# Patient Record
Sex: FEMALE | Race: WHITE | Employment: FULL TIME | URBAN - METROPOLITAN AREA
[De-identification: names, ages, dates, MRNs, and addresses within clinical notes are randomized per-mention and may not be internally consistent; named-entity substitution may affect disease eponyms.]

---

## 2021-11-04 ENCOUNTER — HOSPITAL ENCOUNTER (EMERGENCY)
Age: 21
Discharge: HOME | End: 2021-11-04
Attending: STUDENT IN AN ORGANIZED HEALTH CARE EDUCATION/TRAINING PROGRAM

## 2021-11-04 VITALS
DIASTOLIC BLOOD PRESSURE: 88 MMHG | HEIGHT: 60 IN | OXYGEN SATURATION: 100 % | RESPIRATION RATE: 18 BRPM | TEMPERATURE: 98.2 F | HEART RATE: 73 BPM | BODY MASS INDEX: 46.82 KG/M2 | WEIGHT: 238.5 LBS | SYSTOLIC BLOOD PRESSURE: 133 MMHG

## 2021-11-04 DIAGNOSIS — G89.29 CHRONIC LOW BACK PAIN, UNSPECIFIED BACK PAIN LATERALITY, UNSPECIFIED WHETHER SCIATICA PRESENT: ICD-10-CM

## 2021-11-04 DIAGNOSIS — M54.50 CHRONIC LOW BACK PAIN, UNSPECIFIED BACK PAIN LATERALITY, UNSPECIFIED WHETHER SCIATICA PRESENT: ICD-10-CM

## 2021-11-04 DIAGNOSIS — K08.89 PAIN, DENTAL: Primary | ICD-10-CM

## 2021-11-04 PROCEDURE — 6370000000 HC RX 637 (ALT 250 FOR IP): Performed by: STUDENT IN AN ORGANIZED HEALTH CARE EDUCATION/TRAINING PROGRAM

## 2021-11-04 PROCEDURE — 99283 EMERGENCY DEPT VISIT LOW MDM: CPT

## 2021-11-04 RX ORDER — ACETAMINOPHEN 500 MG
1000 TABLET ORAL EVERY 6 HOURS PRN
Qty: 30 TABLET | Refills: 0 | Status: SHIPPED | OUTPATIENT
Start: 2021-11-04 | End: 2022-07-27

## 2021-11-04 RX ORDER — ACETAMINOPHEN 500 MG
1000 TABLET ORAL ONCE
Status: COMPLETED | OUTPATIENT
Start: 2021-11-04 | End: 2021-11-04

## 2021-11-04 RX ORDER — PENICILLIN V POTASSIUM 250 MG/1
500 TABLET ORAL ONCE
Status: COMPLETED | OUTPATIENT
Start: 2021-11-04 | End: 2021-11-04

## 2021-11-04 RX ORDER — PENICILLIN V POTASSIUM 500 MG/1
500 TABLET ORAL 4 TIMES DAILY
Qty: 28 TABLET | Refills: 0 | Status: SHIPPED | OUTPATIENT
Start: 2021-11-04 | End: 2021-11-11

## 2021-11-04 RX ADMIN — ACETAMINOPHEN 1000 MG: 500 TABLET ORAL at 22:19

## 2021-11-04 RX ADMIN — PENICILLIN V POTASSIUM 500 MG: 250 TABLET, FILM COATED ORAL at 22:19

## 2021-11-04 ASSESSMENT — ENCOUNTER SYMPTOMS
ABDOMINAL PAIN: 0
BACK PAIN: 1
EYE DISCHARGE: 0
COLOR CHANGE: 0
SHORTNESS OF BREATH: 0
EYE REDNESS: 0

## 2021-11-04 ASSESSMENT — PAIN SCALES - GENERAL
PAINLEVEL_OUTOF10: 4
PAINLEVEL_OUTOF10: 4

## 2021-11-04 NOTE — Clinical Note
Amanda Ovalles was seen and treated in our emergency department on 11/4/2021. She may return to work on 11/05/2021. If you have any questions or concerns, please don't hesitate to call.       Joy Barraza, DO

## 2021-11-05 NOTE — ED PROVIDER NOTES
[facility]  Emergency Department Encounter     Pt Name: Rashida Love  MRN: [id number]  [place] 2000  Date of evaluation: 11/4/21  PCP:  MD Haja Hardy       Chief Complaint   Patient presents with    Dental Pain    Back Pain       HISTORY OFPRESENT ILLNESS  (Location/Symptom, Timing/Onset, Context/Setting, Quality, Duration, Modifying Factors,Severity.)      Rashida Love is a 23 y.o. female who presents with dental pain, left upper posterior molar ongoing for past week. Worsening. Radiating jaw and ear. Does not have a dentist.  No fevers. No trouble swallowing. Left-sided back pain ongoing for the past 2 years. Has not had any advanced imaging performed. Has not followed up with a neurologist or neurosurgeon. Pain is been intermittent. Numbness to anterior thigh. Worse with forward bending. PAST MEDICAL / SURGICAL / SOCIAL / FAMILY HISTORY      has a past medical history of Asthma. has no past surgical history on file. Social History     Socioeconomic History    Marital status: Single     Spouse name: Not on file    Number of children: Not on file    Years of education: Not on file    Highest education level: Not on file   Occupational History    Not on file   Tobacco Use    Smoking status: Current Every Day Smoker    Smokeless tobacco: Never Used   Substance and Sexual Activity    Alcohol use: Yes    Drug use: Never    Sexual activity: Not on file   Other Topics Concern    Not on file   Social History Narrative    Not on file     Social Determinants of Health     Financial Resource Strain:     Difficulty of Paying Living Expenses:    Food Insecurity:     Worried About Running Out of Food in the Last Year:     [address] in the Last Year:    Transportation Needs:     Lack of Transportation (Medical):      Lack of Transportation (Non-Medical):    Physical Activity:     Days of Exercise per Week:     Minutes of temperature is 98.2 °F (36.8 °C). Her blood pressure is 133/88 and her pulse is 73. Her respiration is 18 and oxygen saturation is 100%. Physical Exam  Vitals and nursing note reviewed. Constitutional:       Appearance: She is well-developed. HENT:      Head: Normocephalic and atraumatic. Nose: Nose normal.      Mouth/Throat:      Mouth: Mucous membranes are moist.      Comments: Uvula midline, no pooling secretions, no muffled voice, left superior posterior for dentition with mild erythema and swelling  Eyes:      General: No scleral icterus. Conjunctiva/sclera: Conjunctivae normal.      Pupils: Pupils are equal, round, and reactive to light. Cardiovascular:      Rate and Rhythm: Normal rate and regular rhythm. Heart sounds: Normal heart sounds. No murmur heard. No friction rub. No gallop. Pulmonary:      Effort: Pulmonary effort is normal. No respiratory distress. Breath sounds: Normal breath sounds. No wheezing or rales. Musculoskeletal:         General: Normal range of motion. Skin:     General: Skin is warm and dry. Findings: No erythema or rash. Neurological:      Mental Status: She is alert and oriented to person, place, and time. Comments: No facial asymmetry, no aphasia, no dysarthria, EOMI, PERRLA; 5/5 strength in upper and lower extremities b/l; reports change in sensation to anterior thigh on left     Psychiatric:         Behavior: Behavior normal.         DIFFERENTIAL  DIAGNOSIS     PLAN (LABS / IMAGING / EKG):  No orders of the defined types were placed in this encounter.       MEDICATIONS ORDERED:  Orders Placed This Encounter   Medications    penicillin v potassium (VEETID) 500 MG tablet     Sig: Take 1 tablet by mouth 4 times daily for 7 days     Dispense:  28 tablet     Refill:  0    acetaminophen (APAP EXTRA STRENGTH) 500 MG tablet     Sig: Take 2 tablets by mouth every 6 hours as needed for Pain     Dispense:  30 tablet     Refill:  0    benzocaine (ORAJEL) 10 % mucosal gel     Sig: Apply to affected tooth up to 4 times a day as needed for pain     Dispense:  9 g     Refill:  0    acetaminophen (TYLENOL) tablet 1,000 mg    penicillin v potassium (VEETID) tablet 500 mg     Order Specific Question:   Antimicrobial Indications     Answer:   Head and Neck Infection       DDX: Dental pain versus chronic back pain    Initial MDM/Plan: 23 y.o. female who presents with dental pain and chronic back pain ongoing for 2 years prior. No red flags. Suspicion for any acute back process. Encouraged to follow-up with primary care doctor for further imaging further evaluation. Dental pain follow-up with dentist.  Started on antibiotics and Orajel. DIAGNOSTIC RESULTS / EMERGENCY DEPARTMENT COURSE / MDM     LABS:  Labs Reviewed - No data to display      RADIOLOGY:  No results found. EMERGENCY DEPARTMENT COURSE:        · Based on the low acuity of concerning symptoms and improvement of symptoms, patient will be discharged with follow up and prescription information listed in the Disposition section. · Patient states they will follow-up with primary care physician and/or return to the emergency department should they experience a change or worsening of symptoms. · Patient will be discharged with resources: summary of visit, instructions, follow-up information, prescriptions if necessary. · Patient/ family instructed to read discharge paperwork. All of their questions and concerns were addressed. · Suspicion for any acute life-threatening processes is low. Patient voices understanding of plan. PROCEDURES:  None    CONSULTS:  None    CRITICAL CARE:  0    FINAL IMPRESSION      1. Pain, dental    2.  Chronic low back pain, unspecified back pain laterality, unspecified whether sciatica present          DISPOSITION / PLAN     DISPOSITION Decision To Discharge 11/04/2021 09:53:03 PM        PATIENTREFERRED TO:  Davi Smith, [address] [place] [address]  [phone]    Schedule an appointment as soon as possible for a visit in 1 week        DISCHARGE MEDICATIONS:  Discharge Medication List as of 11/4/2021  9:55 PM      START taking these medications    Details   penicillin v potassium (VEETID) 500 MG tablet Take 1 tablet by mouth 4 times daily for 7 days, Disp-28 tablet, R-0Print      acetaminophen (APAP EXTRA STRENGTH) 500 MG tablet Take 2 tablets by mouth every 6 hours as needed for Pain, Disp-30 tablet, R-0Print      benzocaine (ORAJEL) 10 % mucosal gel Apply to affected tooth up to 4 times a day as needed for pain, Disp-9 g, R-0, Print             Tamiko Sin,   EmergencyMedicine Attending    (Please note that portions of this note were completed with a voice recognition program.  Efforts were made to edit the dictations but occasionally words are mis-transcribed.)       Tamiko Sin DO  11/05/21 2969

## 2021-11-05 NOTE — ED NOTES
Pt presents to ED with c/o of left sided dental pain, pt states the pain travels down both her legs, pt states legs got weak last night at work and she almost fell from the pain.       Rafi White RN  11/04/21 2127

## 2022-02-14 ENCOUNTER — HOSPITAL ENCOUNTER (EMERGENCY)
Age: 22
Discharge: HOME | End: 2022-02-14
Attending: EMERGENCY MEDICINE

## 2022-02-14 ENCOUNTER — APPOINTMENT (OUTPATIENT)
Dept: GENERAL RADIOLOGY | Age: 22
End: 2022-02-14

## 2022-02-14 VITALS
OXYGEN SATURATION: 100 % | DIASTOLIC BLOOD PRESSURE: 91 MMHG | WEIGHT: 230 LBS | SYSTOLIC BLOOD PRESSURE: 147 MMHG | BODY MASS INDEX: 43.43 KG/M2 | TEMPERATURE: 98.2 F | HEIGHT: 61 IN | HEART RATE: 85 BPM | RESPIRATION RATE: 18 BRPM

## 2022-02-14 DIAGNOSIS — J02.9 ACUTE PHARYNGITIS, UNSPECIFIED ETIOLOGY: Primary | ICD-10-CM

## 2022-02-14 LAB
DIRECT EXAM: NORMAL
Lab: NORMAL
SPECIMEN DESCRIPTION: NORMAL

## 2022-02-14 PROCEDURE — 71045 X-RAY EXAM CHEST 1 VIEW: CPT

## 2022-02-14 PROCEDURE — 99283 EMERGENCY DEPT VISIT LOW MDM: CPT

## 2022-02-14 PROCEDURE — 87880 STREP A ASSAY W/OPTIC: CPT

## 2022-02-14 RX ORDER — BENZONATATE 100 MG/1
100 CAPSULE ORAL 3 TIMES DAILY PRN
Qty: 30 CAPSULE | Refills: 0 | Status: SHIPPED | OUTPATIENT
Start: 2022-02-14 | End: 2022-02-21

## 2022-02-14 RX ORDER — PREDNISONE 50 MG/1
50 TABLET ORAL DAILY
Qty: 5 TABLET | Refills: 0 | Status: SHIPPED | OUTPATIENT
Start: 2022-02-14 | End: 2022-02-19

## 2022-02-14 RX ORDER — AZITHROMYCIN 250 MG/1
250 TABLET, FILM COATED ORAL SEE ADMIN INSTRUCTIONS
Qty: 6 TABLET | Refills: 0 | Status: SHIPPED | OUTPATIENT
Start: 2022-02-14 | End: 2022-02-19

## 2022-02-14 ASSESSMENT — ENCOUNTER SYMPTOMS
SHORTNESS OF BREATH: 0
COUGH: 1
RHINORRHEA: 0
NAUSEA: 0
VOMITING: 0
COLOR CHANGE: 0
SORE THROAT: 0
DIARRHEA: 0
EYE DISCHARGE: 0
EYE REDNESS: 0
SINUS PRESSURE: 1

## 2022-02-14 ASSESSMENT — PAIN SCALES - GENERAL: PAINLEVEL_OUTOF10: 6

## 2022-02-14 NOTE — LETTER
[facility] ED  [address] [place] [address]  Phone: [phone]             February 14, 2022    Patient: Frantz Rivas   YOB: 2000   Date of Visit: 2/14/2022       To Whom It May Concern:    Karyna Miranda was seen and treated in our emergency department on 2/14/2022. She may return to work Thursday 2-17-22.       Sincerely,             Signature:__________________________________

## 2022-02-14 NOTE — ED PROVIDER NOTES
EMERGENCY DEPARTMENT ENCOUNTER    Pt Name: Eulalia Hawk  MRN: [id number]  [place] 2000  Date of evaluation: 2/14/22  CHIEF COMPLAINT       Chief Complaint   Patient presents with    Cough     x several days    Shortness of Breath     HISTORY OF PRESENT ILLNESS   This is a 65-year-old female that presents with complaints of sore throat, congestion, cough and not feeling well. Patient denies any fevers or chills, she complains of some generalized body aches. No previous history of COVID-19, she is not vaccinated. She does have a history of asthma. She states her symptoms began a few days ago. REVIEW OF SYSTEMS     Review of Systems   Constitutional: Negative for chills and fever. HENT: Positive for congestion and sinus pressure. Negative for rhinorrhea and sore throat. Eyes: Negative for discharge, redness and visual disturbance. Respiratory: Positive for cough. Negative for shortness of breath. Cardiovascular: Negative for chest pain, palpitations and leg swelling. Gastrointestinal: Negative for diarrhea, nausea and vomiting. Genitourinary: Negative for dysuria and hematuria. Musculoskeletal: Negative for arthralgias, myalgias and neck pain. Skin: Negative for color change and rash. Neurological: Negative for seizures, weakness and headaches. Psychiatric/Behavioral: Negative for hallucinations, self-injury and suicidal ideas. PASTMEDICAL HISTORY     Past Medical History:   Diagnosis Date    Asthma      Past Problem List  There is no problem list on file for this patient. SURGICAL HISTORY     History reviewed. No pertinent surgical history. CURRENT MEDICATIONS       Previous Medications    ACETAMINOPHEN (APAP EXTRA STRENGTH) 500 MG TABLET    Take 2 tablets by mouth every 6 hours as needed for Pain    BENZOCAINE (ORAJEL) 10 % MUCOSAL GEL    Apply to affected tooth up to 4 times a day as needed for pain     ALLERGIES     is allergic to aspirin.   FAMILY HISTORY     has no family status information on file. SOCIAL HISTORY       Social History     Tobacco Use    Smoking status: Current Every Day Smoker    Smokeless tobacco: Never Used   Substance Use Topics    Alcohol use: Yes    Drug use: Never     PHYSICAL EXAM     INITIAL VITALS: BP (!) 147/91   Pulse 85   Temp 98.2 °F (36.8 °C) (Oral)   Resp 18   Ht 5' 1\" (1.549 m)   Wt 230 lb (104.3 kg)   SpO2 100%   BMI 43.46 kg/m²    Physical Exam  Constitutional:       Appearance: Normal appearance. HENT:      Head: Normocephalic and atraumatic. Mouth/Throat:      Pharynx: Posterior oropharyngeal erythema present. No pharyngeal swelling, oropharyngeal exudate or uvula swelling. Tonsils: No tonsillar exudate or tonsillar abscesses. Eyes:      Extraocular Movements: Extraocular movements intact. Pupils: Pupils are equal, round, and reactive to light. Cardiovascular:      Rate and Rhythm: Normal rate and regular rhythm. Pulmonary:      Effort: Pulmonary effort is normal.      Breath sounds: Normal breath sounds. Abdominal:      General: Abdomen is flat. Palpations: Abdomen is soft. Tenderness: There is no abdominal tenderness. Neurological:      Mental Status: She is alert. MEDICAL DECISION MAKIN-year-old female presents with complaints of nasal congestion cough and sore throat. Plan is strep screen chest x-ray and reevaluation. 4:40 PM EST  Strep negative, chest x-ray clear, plan is discharged with prednisone azithromycin Tessalon and outpatient follow-up.          CRITICAL CARE:       PROCEDURES:    Procedures    DIAGNOSTIC RESULTS   EKG:All EKG's are interpreted by the Emergency Department Physician who either signs or Co-signs this chart in the absence of a cardiologist.        RADIOLOGY:All plain film, CT, MRI, and formal ultrasound images (except ED bedside ultrasound) are read by the radiologist, see reports below, unless otherwisenoted in MDM or here.  XR CHEST PORTABLE    (Results Pending)     LABS: All lab results were reviewed by myself, and all abnormals are listed below. Labs Reviewed   STREP SCREEN GROUP A THROAT       EMERGENCY DEPARTMENTCOURSE:         Vitals:    Vitals:    02/14/22 1551   BP: (!) 147/91   Pulse: 85   Resp: 18   Temp: 98.2 °F (36.8 °C)   TempSrc: Oral   SpO2: 100%   Weight: 230 lb (104.3 kg)   Height: 5' 1\" (1.549 m)       The patient was given the following medications while in the emergency department:  Orders Placed This Encounter   Medications    azithromycin (ZITHROMAX) 250 MG tablet     Sig: Take 1 tablet by mouth See Admin Instructions for 5 days 500mg on day 1 followed by 250mg on days 2 - 5     Dispense:  6 tablet     Refill:  0    predniSONE (DELTASONE) 50 MG tablet     Sig: Take 1 tablet by mouth daily for 5 days     Dispense:  5 tablet     Refill:  0    benzonatate (TESSALON PERLES) 100 MG capsule     Sig: Take 1 capsule by mouth 3 times daily as needed for Cough     Dispense:  30 capsule     Refill:  0     CONSULTS:  None    FINAL IMPRESSION      1. Acute pharyngitis, unspecified etiology          DISPOSITION/PLAN   DISPOSITION Decision To Discharge 02/14/2022 04:38:58 PM      PATIENT REFERRED TO:  Britt Monroy MD  [address] [place] [address]  [phone]    Schedule an appointment as soon as possible for a visit in 2 days      DISCHARGE MEDICATIONS:  New Prescriptions    AZITHROMYCIN (ZITHROMAX) 250 MG TABLET    Take 1 tablet by mouth See Admin Instructions for 5 days 500mg on day 1 followed by 250mg on days 2 - 5    BENZONATATE (TESSALON PERLES) 100 MG CAPSULE    Take 1 capsule by mouth 3 times daily as needed for Cough    PREDNISONE (DELTASONE) 50 MG TABLET    Take 1 tablet by mouth daily for 5 days     The care is provided during an unprecedented national emergency due to the novel coronavirus, COVID 19.   MD Eulalia Jennings MD  02/14/22 9459

## 2022-07-27 ENCOUNTER — HOSPITAL ENCOUNTER (EMERGENCY)
Age: 22
Discharge: HOME | End: 2022-07-27
Attending: EMERGENCY MEDICINE

## 2022-07-27 VITALS
OXYGEN SATURATION: 98 % | HEART RATE: 105 BPM | RESPIRATION RATE: 18 BRPM | SYSTOLIC BLOOD PRESSURE: 157 MMHG | DIASTOLIC BLOOD PRESSURE: 120 MMHG | TEMPERATURE: 98.4 F

## 2022-07-27 DIAGNOSIS — K08.89 PAIN, DENTAL: Primary | ICD-10-CM

## 2022-07-27 PROCEDURE — 96372 THER/PROPH/DIAG INJ SC/IM: CPT

## 2022-07-27 PROCEDURE — 99284 EMERGENCY DEPT VISIT MOD MDM: CPT

## 2022-07-27 PROCEDURE — 6360000002 HC RX W HCPCS: Performed by: NURSE PRACTITIONER

## 2022-07-27 PROCEDURE — 6370000000 HC RX 637 (ALT 250 FOR IP): Performed by: NURSE PRACTITIONER

## 2022-07-27 RX ORDER — KETOROLAC TROMETHAMINE 30 MG/ML
30 INJECTION, SOLUTION INTRAMUSCULAR; INTRAVENOUS ONCE
Status: COMPLETED | OUTPATIENT
Start: 2022-07-27 | End: 2022-07-27

## 2022-07-27 RX ORDER — ACETAMINOPHEN AND CODEINE PHOSPHATE 300; 30 MG/1; MG/1
1 TABLET ORAL EVERY 6 HOURS PRN
Qty: 15 TABLET | Refills: 0 | Status: SHIPPED | OUTPATIENT
Start: 2022-07-27 | End: 2022-07-31

## 2022-07-27 RX ORDER — CHLORHEXIDINE GLUCONATE 0.12 MG/ML
15 RINSE ORAL 2 TIMES DAILY
Qty: 300 ML | Refills: 0 | Status: SHIPPED | OUTPATIENT
Start: 2022-07-27 | End: 2022-08-06

## 2022-07-27 RX ORDER — PENICILLIN V POTASSIUM 500 MG/1
500 TABLET ORAL 4 TIMES DAILY
Qty: 40 TABLET | Refills: 0 | Status: SHIPPED | OUTPATIENT
Start: 2022-07-27 | End: 2022-08-06

## 2022-07-27 RX ORDER — TRAMADOL HYDROCHLORIDE 50 MG/1
50 TABLET ORAL ONCE
Status: COMPLETED | OUTPATIENT
Start: 2022-07-27 | End: 2022-07-27

## 2022-07-27 RX ADMIN — KETOROLAC TROMETHAMINE 30 MG: 30 INJECTION, SOLUTION INTRAMUSCULAR at 16:25

## 2022-07-27 RX ADMIN — TRAMADOL HYDROCHLORIDE 50 MG: 50 TABLET, COATED ORAL at 16:25

## 2022-07-27 ASSESSMENT — ENCOUNTER SYMPTOMS
TROUBLE SWALLOWING: 0
FACIAL SWELLING: 0

## 2022-07-27 ASSESSMENT — PAIN SCALES - GENERAL: PAINLEVEL_OUTOF10: 9

## 2022-07-27 NOTE — Clinical Note
Cedrick Holliday was seen and treated in our emergency department on 7/27/2022. She may return to work on 07/31/2022. If you have any questions or concerns, please don't hesitate to call.       Francisco J Paris, APRN - CNP

## 2022-07-27 NOTE — ED PROVIDER NOTES
[facility] ED  EMERGENCY DEPARTMENT ENCOUNTER      Pt Name: Romel Sun  MRN: [id number]  [place] 2000  Date of evaluation: 7/27/2022  Provider: VIJAYA Hunt CNP    CHIEF COMPLAINT       Chief Complaint   Patient presents with    Dental Pain         HISTORY OFPRESENT ILLNESS  (Location/Symptom, Timing/Onset, Context/Setting, Quality, Duration, Modifying Factors, Severity.)   Romel Sun is a 24 y.o. female who presents to the emergency department by private auto for evaluation of left upper dental pain for the past 3 weeks. Pain is a 10. Does not have a dentist.  No trouble swallowing. Afebrile. Nursing Notes were reviewed. PASTMEDICAL HISTORY     Past Medical History:   Diagnosis Date    Asthma          SURGICAL HISTORY     History reviewed. No pertinent surgical history. CURRENT MEDICATIONS     Previous Medications    No medications on file       ALLERGIES     Aspirin    FAMILY HISTORY     History reviewed. No pertinent family history. SOCIAL HISTORY       Social History     Socioeconomic History    Marital status: Single     Spouse name: None    Number of children: None    Years of education: None    Highest education level: None   Tobacco Use    Smoking status: Every Day    Smokeless tobacco: Never   Substance and Sexual Activity    Alcohol use: Yes    Drug use: Never         REVIEW OF SYSTEMS    (2-9 systems for level 4, 10 or more for level 5)     Review of Systems   Constitutional:  Negative for fever. HENT:  Positive for dental problem. Negative for facial swelling and trouble swallowing. All other systems reviewed and are negative. Except as noted above the remainder of the review of systems was reviewed and negative.      PHYSICAL EXAM    (up to 7 for level 4, 8 or more for level 5)     ED Triage Vitals [07/27/22 1509]   BP Temp Temp Source Heart Rate Resp SpO2 Height Weight   (!) 157/120 98.4 °F (36.9 °C) Oral (!) 105 18 98 % -- --       Physical Exam  Constitutional:       Appearance: Normal appearance. She is well-developed, well-groomed and overweight. HENT:      Head: Normocephalic. Right Ear: External ear normal.      Left Ear: External ear normal.      Nose: Nose normal.      Mouth/Throat:      Lips: Pink. Mouth: Mucous membranes are moist.      Dentition: Abnormal dentition. Dental tenderness, gingival swelling and dental caries present. No dental abscesses. Eyes:      Conjunctiva/sclera: Conjunctivae normal.   Pulmonary:      Effort: Pulmonary effort is normal. No respiratory distress. Musculoskeletal:         General: Normal range of motion. Cervical back: Normal range of motion. Neurological:      Mental Status: She is alert and oriented to person, place, and time. Psychiatric:         Mood and Affect: Mood normal.         Behavior: Behavior normal.         Thought Content: Thought content normal.         Judgment: Judgment normal.         DIAGNOSTIC RESULTS     EKG:All EKG's are interpreted by the Emergency Department Physician who either signs or Co-signs this chart in the absence of a cardiologist.        RADIOLOGY:   Non-plain film images such as CT, Ultrasound and MRI are read by theradiologist. Plain radiographic images are visualized and preliminarily interpreted by the emergency physician with the below findings:        Interpretation per the Radiologist below, if available at the time of this note:    No orders to display         EDBEDSIDE ULTRASOUND:   Performed by Baljit Thorne - none    LABS:  Labs Reviewed - No data to display    All other labs were within normal range or not returned as of this dictation. EMERGENCY DEPARTMENT COURSE andDIFFERENTIAL DIAGNOSIS/MDM:   Patient presents with dental issue as above assessment. No abscess. Patient controlling her secretions. Does not have a dentist.  States she got brought to ER by her mother. Requesting for pain while in the ED.   She was given Toradol and tramadol in the ED. Prescriptions written for Peridex solution, Tylenol codeine, and pen vk. She was provided with a dental clinic list for follow-up. Return precautions provided. Vitals:    Vitals:    07/27/22 1509   BP: (!) 157/120   Pulse: (!) 105   Resp: 18   Temp: 98.4 °F (36.9 °C)   TempSrc: Oral   SpO2: 98%         CONSULTS:  None    RES:  Procedures    FINAL IMPRESSION      1. Pain, dental          DISPOSITION/PLAN   DISPOSITION Decision To Discharge 07/27/2022 04:23:47 PM      PATIENT REFERRED TO:     Follow-up with dentist on clinic list provided. Schedule an appointment as soon as possible for a visit       [facility] ED  [address] [facility]  [phone]    If symptoms worsen    DISCHARGE MEDICATIONS:     New Prescriptions    ACETAMINOPHEN-CODEINE (TYLENOL/CODEINE #3) 300-30 MG PER TABLET    Take 1 tablet by mouth every 6 hours as needed for Pain for up to 4 days. Intended supply: 3 days. Take lowest dose possible to manage pain    CHLORHEXIDINE (PERIDEX) 0.12 % SOLUTION    Take 15 mLs by mouth in the morning and 15 mLs before bedtime. Do all this for 10 days. PENICILLIN V POTASSIUM (VEETID) 500 MG TABLET    Take 1 tablet by mouth in the morning and 1 tablet at noon and 1 tablet in the evening and 1 tablet before bedtime. Do all this for 10 days.      Electronically signed by VIJAYA Warner 7/27/2022 at 4:28 PM           VIJAYA Warner CNP  07/27/22 8636

## 2022-07-27 NOTE — DISCHARGE INSTRUCTIONS
Take medications as prescribed. Do not drive, operate machinery, or consume alcohol taking Tylenol codeine as it can cause drowsiness. Follow-up with dentist on clinic list provided. Return to emergency department symptoms worsen.

## 2022-07-29 NOTE — ED PROVIDER NOTES
eMERGENCY dEPARTMENT eNCOUnter   Independent Attestation     Pt Name: Susan Adair  MRN: [id number]  [place] 2000  Date of evaluation: 7/29/22     Susan Adair is a 24 y.o. female with CC: Dental Pain        This visit was performed by both a physician and an APC. I performed all aspects of the MDM as documented. The care is provided during an unprecedented national emergency due to the novel coronavirus, COVID 19.     Yumiko Mejias MD  Attending Emergency Physician           Nicky Rich MD  07/29/22 2362

## 2023-02-16 ENCOUNTER — APPOINTMENT (OUTPATIENT)
Dept: GENERAL RADIOLOGY | Age: 23
DRG: 053 | End: 2023-02-16
Payer: MEDICAID

## 2023-02-16 ENCOUNTER — HOSPITAL ENCOUNTER (INPATIENT)
Age: 23
LOS: 2 days | Discharge: ANOTHER HOSPITAL | DRG: 053 | End: 2023-02-19
Attending: EMERGENCY MEDICINE | Admitting: STUDENT IN AN ORGANIZED HEALTH CARE EDUCATION/TRAINING PROGRAM
Payer: MEDICAID

## 2023-02-16 ENCOUNTER — APPOINTMENT (OUTPATIENT)
Dept: CT IMAGING | Age: 23
DRG: 053 | End: 2023-02-16
Payer: MEDICAID

## 2023-02-16 DIAGNOSIS — R56.9 SEIZURE-LIKE ACTIVITY (HCC): Primary | ICD-10-CM

## 2023-02-16 DIAGNOSIS — R94.31 ABNORMAL EKG: ICD-10-CM

## 2023-02-16 DIAGNOSIS — N39.0 URINARY TRACT INFECTION WITHOUT HEMATURIA, SITE UNSPECIFIED: ICD-10-CM

## 2023-02-16 PROBLEM — Z72.0 TOBACCO USE: Chronic | Status: ACTIVE | Noted: 2023-02-16

## 2023-02-16 PROBLEM — N30.00 ACUTE CYSTITIS WITHOUT HEMATURIA: Status: ACTIVE | Noted: 2023-02-16

## 2023-02-16 LAB
ABSOLUTE EOS #: 0.19 K/UL (ref 0–0.44)
ABSOLUTE IMMATURE GRANULOCYTE: 0.02 K/UL (ref 0–0.3)
ABSOLUTE LYMPH #: 1.63 K/UL (ref 1.1–3.7)
ABSOLUTE MONO #: 0.71 K/UL (ref 0.1–1.2)
AMPHETAMINE SCREEN URINE: NEGATIVE
ANION GAP SERPL CALCULATED.3IONS-SCNC: 9 MMOL/L (ref 9–17)
BACTERIA: ABNORMAL
BARBITURATE SCREEN URINE: NEGATIVE
BASOPHILS # BLD: 1 % (ref 0–2)
BASOPHILS ABSOLUTE: 0.06 K/UL (ref 0–0.2)
BENZODIAZEPINE SCREEN, URINE: NEGATIVE
BILIRUBIN URINE: NEGATIVE
BUN SERPL-MCNC: 14 MG/DL (ref 6–20)
BUN/CREAT BLD: 25 (ref 9–20)
CALCIUM SERPL-MCNC: 9.2 MG/DL (ref 8.6–10.4)
CANNABINOID SCREEN URINE: NEGATIVE
CHLORIDE SERPL-SCNC: 105 MMOL/L (ref 98–107)
CO2 SERPL-SCNC: 25 MMOL/L (ref 20–31)
COCAINE METABOLITE, URINE: NEGATIVE
COLOR: YELLOW
CREAT SERPL-MCNC: 0.55 MG/DL (ref 0.5–0.9)
EKG ATRIAL RATE: 70 BPM
EKG P AXIS: 20 DEGREES
EKG P-R INTERVAL: 110 MS
EKG Q-T INTERVAL: 396 MS
EKG QRS DURATION: 124 MS
EKG QTC CALCULATION (BAZETT): 427 MS
EKG R AXIS: -32 DEGREES
EKG T AXIS: 53 DEGREES
EKG VENTRICULAR RATE: 70 BPM
EOSINOPHILS RELATIVE PERCENT: 3 % (ref 1–4)
EPITHELIAL CELLS UA: ABNORMAL /HPF (ref 0–5)
ETHANOL PERCENT: <0.01 %
ETHANOL: <10 MG/DL
FENTANYL URINE: NEGATIVE
GFR SERPL CREATININE-BSD FRML MDRD: >60 ML/MIN/1.73M2
GLUCOSE SERPL-MCNC: 86 MG/DL (ref 70–99)
GLUCOSE UR STRIP.AUTO-MCNC: NEGATIVE MG/DL
HCG QUALITATIVE: NEGATIVE
HCT VFR BLD AUTO: 44.6 % (ref 36.3–47.1)
HGB BLD-MCNC: 14.4 G/DL (ref 11.9–15.1)
IMMATURE GRANULOCYTES: 0 %
KETONES UR STRIP.AUTO-MCNC: NEGATIVE MG/DL
LEUKOCYTE ESTERASE UR QL STRIP.AUTO: ABNORMAL
LYMPHOCYTES # BLD: 27 % (ref 24–43)
MCH RBC QN AUTO: 27 PG (ref 25.2–33.5)
MCHC RBC AUTO-ENTMCNC: 32.3 G/DL (ref 28.4–34.8)
MCV RBC AUTO: 83.5 FL (ref 82.6–102.9)
METHADONE SCREEN, URINE: NEGATIVE
MONOCYTES # BLD: 12 % (ref 3–12)
NITRITE UR QL STRIP.AUTO: NEGATIVE
NRBC AUTOMATED: 0 PER 100 WBC
OPIATES, URINE: NEGATIVE
OXYCODONE SCREEN URINE: NEGATIVE
PDW BLD-RTO: 13 % (ref 11.8–14.4)
PHENCYCLIDINE, URINE: NEGATIVE
PLATELET # BLD AUTO: 367 K/UL (ref 138–453)
PMV BLD AUTO: 9.8 FL (ref 8.1–13.5)
POTASSIUM SERPL-SCNC: 3.6 MMOL/L (ref 3.7–5.3)
PROT UR STRIP.AUTO-MCNC: 7 MG/DL (ref 5–8)
PROT UR STRIP.AUTO-MCNC: NEGATIVE MG/DL
RBC # BLD: 5.34 M/UL (ref 3.95–5.11)
RBC CLUMPS #/AREA URNS AUTO: ABNORMAL /HPF (ref 0–2)
SEG NEUTROPHILS: 57 % (ref 36–65)
SEGMENTED NEUTROPHILS ABSOLUTE COUNT: 3.54 K/UL (ref 1.5–8.1)
SODIUM SERPL-SCNC: 139 MMOL/L (ref 135–144)
SPECIFIC GRAVITY UA: 1.01 (ref 1–1.03)
TEST INFORMATION: NORMAL
TURBIDITY: ABNORMAL
URINE HGB: NEGATIVE
UROBILINOGEN, URINE: NORMAL
WBC # BLD AUTO: 6.2 K/UL (ref 3.5–11.3)
WBC UA: ABNORMAL /HPF (ref 0–5)

## 2023-02-16 PROCEDURE — 80307 DRUG TEST PRSMV CHEM ANLYZR: CPT

## 2023-02-16 PROCEDURE — G0378 HOSPITAL OBSERVATION PER HR: HCPCS

## 2023-02-16 PROCEDURE — 2580000003 HC RX 258: Performed by: NURSE PRACTITIONER

## 2023-02-16 PROCEDURE — 96365 THER/PROPH/DIAG IV INF INIT: CPT

## 2023-02-16 PROCEDURE — 71045 X-RAY EXAM CHEST 1 VIEW: CPT

## 2023-02-16 PROCEDURE — 81001 URINALYSIS AUTO W/SCOPE: CPT

## 2023-02-16 PROCEDURE — 96367 TX/PROPH/DG ADDL SEQ IV INF: CPT

## 2023-02-16 PROCEDURE — 84146 ASSAY OF PROLACTIN: CPT

## 2023-02-16 PROCEDURE — 6370000000 HC RX 637 (ALT 250 FOR IP): Performed by: NURSE PRACTITIONER

## 2023-02-16 PROCEDURE — 99285 EMERGENCY DEPT VISIT HI MDM: CPT

## 2023-02-16 PROCEDURE — G0480 DRUG TEST DEF 1-7 CLASSES: HCPCS

## 2023-02-16 PROCEDURE — 6360000002 HC RX W HCPCS: Performed by: NURSE PRACTITIONER

## 2023-02-16 PROCEDURE — 70450 CT HEAD/BRAIN W/O DYE: CPT

## 2023-02-16 PROCEDURE — 99222 1ST HOSP IP/OBS MODERATE 55: CPT | Performed by: NURSE PRACTITIONER

## 2023-02-16 PROCEDURE — 84703 CHORIONIC GONADOTROPIN ASSAY: CPT

## 2023-02-16 PROCEDURE — 93010 ELECTROCARDIOGRAM REPORT: CPT | Performed by: INTERNAL MEDICINE

## 2023-02-16 PROCEDURE — 96375 TX/PRO/DX INJ NEW DRUG ADDON: CPT

## 2023-02-16 PROCEDURE — 93005 ELECTROCARDIOGRAM TRACING: CPT | Performed by: NURSE PRACTITIONER

## 2023-02-16 PROCEDURE — 80048 BASIC METABOLIC PNL TOTAL CA: CPT

## 2023-02-16 PROCEDURE — 85025 COMPLETE CBC W/AUTO DIFF WBC: CPT

## 2023-02-16 RX ORDER — LEVETIRACETAM 500 MG/1
500 TABLET ORAL 2 TIMES DAILY
Status: DISCONTINUED | OUTPATIENT
Start: 2023-02-17 | End: 2023-02-19 | Stop reason: HOSPADM

## 2023-02-16 RX ORDER — POLYETHYLENE GLYCOL 3350 17 G/17G
17 POWDER, FOR SOLUTION ORAL DAILY PRN
Status: DISCONTINUED | OUTPATIENT
Start: 2023-02-16 | End: 2023-02-19 | Stop reason: HOSPADM

## 2023-02-16 RX ORDER — MORPHINE SULFATE 2 MG/ML
2 INJECTION, SOLUTION INTRAMUSCULAR; INTRAVENOUS ONCE
Status: COMPLETED | OUTPATIENT
Start: 2023-02-16 | End: 2023-02-16

## 2023-02-16 RX ORDER — SODIUM CHLORIDE 9 MG/ML
INJECTION, SOLUTION INTRAVENOUS CONTINUOUS
Status: DISCONTINUED | OUTPATIENT
Start: 2023-02-16 | End: 2023-02-19 | Stop reason: HOSPADM

## 2023-02-16 RX ORDER — ONDANSETRON 4 MG/1
4 TABLET, ORALLY DISINTEGRATING ORAL EVERY 8 HOURS PRN
Status: DISCONTINUED | OUTPATIENT
Start: 2023-02-16 | End: 2023-02-19 | Stop reason: HOSPADM

## 2023-02-16 RX ORDER — SODIUM CHLORIDE 0.9 % (FLUSH) 0.9 %
5-40 SYRINGE (ML) INJECTION PRN
Status: DISCONTINUED | OUTPATIENT
Start: 2023-02-16 | End: 2023-02-19 | Stop reason: HOSPADM

## 2023-02-16 RX ORDER — ENOXAPARIN SODIUM 100 MG/ML
40 INJECTION SUBCUTANEOUS DAILY
Status: DISCONTINUED | OUTPATIENT
Start: 2023-02-17 | End: 2023-02-19 | Stop reason: HOSPADM

## 2023-02-16 RX ORDER — SODIUM CHLORIDE 0.9 % (FLUSH) 0.9 %
5-40 SYRINGE (ML) INJECTION EVERY 12 HOURS SCHEDULED
Status: DISCONTINUED | OUTPATIENT
Start: 2023-02-16 | End: 2023-02-19 | Stop reason: HOSPADM

## 2023-02-16 RX ORDER — LEVETIRACETAM 10 MG/ML
1000 INJECTION INTRAVASCULAR ONCE
Status: COMPLETED | OUTPATIENT
Start: 2023-02-16 | End: 2023-02-16

## 2023-02-16 RX ORDER — ACETAMINOPHEN 325 MG/1
650 TABLET ORAL EVERY 6 HOURS PRN
Status: DISCONTINUED | OUTPATIENT
Start: 2023-02-16 | End: 2023-02-19 | Stop reason: HOSPADM

## 2023-02-16 RX ORDER — ONDANSETRON 2 MG/ML
4 INJECTION INTRAMUSCULAR; INTRAVENOUS EVERY 6 HOURS PRN
Status: DISCONTINUED | OUTPATIENT
Start: 2023-02-16 | End: 2023-02-19 | Stop reason: HOSPADM

## 2023-02-16 RX ORDER — ACETAMINOPHEN 650 MG/1
650 SUPPOSITORY RECTAL EVERY 6 HOURS PRN
Status: DISCONTINUED | OUTPATIENT
Start: 2023-02-16 | End: 2023-02-19 | Stop reason: HOSPADM

## 2023-02-16 RX ORDER — SODIUM CHLORIDE 9 MG/ML
INJECTION, SOLUTION INTRAVENOUS PRN
Status: DISCONTINUED | OUTPATIENT
Start: 2023-02-16 | End: 2023-02-19 | Stop reason: HOSPADM

## 2023-02-16 RX ORDER — 0.9 % SODIUM CHLORIDE 0.9 %
1000 INTRAVENOUS SOLUTION INTRAVENOUS ONCE
Status: COMPLETED | OUTPATIENT
Start: 2023-02-16 | End: 2023-02-16

## 2023-02-16 RX ADMIN — MORPHINE SULFATE 2 MG: 2 INJECTION, SOLUTION INTRAMUSCULAR; INTRAVENOUS at 22:19

## 2023-02-16 RX ADMIN — ACETAMINOPHEN 650 MG: 325 TABLET ORAL at 19:55

## 2023-02-16 RX ADMIN — SODIUM CHLORIDE: 9 INJECTION, SOLUTION INTRAVENOUS at 22:29

## 2023-02-16 RX ADMIN — CEFTRIAXONE SODIUM 1000 MG: 1 INJECTION, POWDER, FOR SOLUTION INTRAMUSCULAR; INTRAVENOUS at 18:21

## 2023-02-16 RX ADMIN — LEVETIRACETAM 1000 MG: 10 INJECTION INTRAVENOUS at 19:26

## 2023-02-16 RX ADMIN — SODIUM CHLORIDE, PRESERVATIVE FREE 10 ML: 5 INJECTION INTRAVENOUS at 22:21

## 2023-02-16 RX ADMIN — SODIUM CHLORIDE 1000 ML: 9 INJECTION, SOLUTION INTRAVENOUS at 19:43

## 2023-02-16 ASSESSMENT — PAIN SCALES - GENERAL
PAINLEVEL_OUTOF10: 5
PAINLEVEL_OUTOF10: 7

## 2023-02-16 ASSESSMENT — ENCOUNTER SYMPTOMS
SHORTNESS OF BREATH: 0
ABDOMINAL PAIN: 0
COUGH: 0

## 2023-02-16 ASSESSMENT — PAIN DESCRIPTION - ORIENTATION: ORIENTATION: MID

## 2023-02-16 ASSESSMENT — PAIN DESCRIPTION - DESCRIPTORS: DESCRIPTORS: PRESSURE

## 2023-02-16 ASSESSMENT — VISUAL ACUITY: OU: 1

## 2023-02-16 ASSESSMENT — PAIN DESCRIPTION - LOCATION: LOCATION: HEAD

## 2023-02-16 NOTE — ED PROVIDER NOTES
[address] ED  eMERGENCY dEPARTMENT eNCOUnter      Pt Name: Alfreida Hatchet  MRN: [id number]  [place] 2000  Date of evaluation: 2/16/2023  Provider: Phillip Samson       Chief Complaint   Patient presents with    Seizures     Pt states she had 3 seizures today and 3 last night. Headache         HISTORY OF PRESENT ILLNESS  (Location/Symptom, Timing/Onset, Context/Setting, Quality, Duration, Modifying Factors, Severity.)   Alfreida Hatchet is a 20 y.o. female who presents to the emergency department by EMS for evaluation of headache and seizures. Patient does not have a history of seizures. Patient states she had 3 seizures last night and 3 seizures today. Patient states one of her seizures and was witnessed by her boyfriend who claimed it lasted for 5 minutes. Patient did not lose bowel or bladder control. Did not bite her tongue. Has a mild headache in the back of her head. States 3 days ago at work she did strike the back of her head on a shelf at work. Denies loss of consciousness. Denies a disturbance vomiting, cough, shortness of breath, abdominal pain or dysuria. Denies drug or alcohol use. Patient is not postictal upon arrival.      Nursing Notes were reviewed. ALLERGIES     Aspirin    CURRENT MEDICATIONS       Previous Medications    No medications on file       PAST MEDICAL HISTORY         Diagnosis Date    Asthma        SURGICAL HISTORY     History reviewed. No pertinent surgical history. FAMILY HISTORY     History reviewed. No pertinent family history. No family status information on file. SOCIAL HISTORY      reports that she has been smoking. She has never used smokeless tobacco. She reports current alcohol use. She reports that she does not use drugs. REVIEW OF SYSTEMS    (2-9 systems for level 4, 10 or more for level 5)   Review of Systems   Constitutional:  Negative for fever.    Respiratory:  Negative for cough and shortness of breath. Cardiovascular:  Negative for chest pain. Gastrointestinal:  Negative for abdominal pain. Neurological:  Positive for dizziness, seizures and headaches. Negative for facial asymmetry, weakness, light-headedness and numbness. All other systems reviewed and are negative. Except as noted above the remainder of the review of systems was reviewed and negative. PHYSICAL EXAM    (up to 7 for level 4, 8 or more for level 5)     ED Triage Vitals [02/16/23 1501]   BP Temp Temp Source Heart Rate Resp SpO2 Height Weight   136/82 98.6 °F (37 °C) Oral 80 16 97 % 5' (1.524 m) 210 lb (95.3 kg)     Physical Exam  Constitutional:       Appearance: Normal appearance. She is well-developed, well-groomed and normal weight. HENT:      Head: Normocephalic. Right Ear: Hearing and external ear normal.      Left Ear: Hearing and external ear normal.      Nose: Nose normal.      Mouth/Throat:      Lips: Pink. Mouth: Mucous membranes are moist. No lacerations. Pharynx: Oropharynx is clear. Uvula midline. Comments: No tongue laceration noted seizure, migraine, head injury seizure  Eyes:      General: Lids are normal. Vision grossly intact. Extraocular Movements: Extraocular movements intact. Conjunctiva/sclera: Conjunctivae normal.      Pupils: Pupils are equal, round, and reactive to light. Cardiovascular:      Rate and Rhythm: Normal rate and regular rhythm. Heart sounds: Normal heart sounds, S1 normal and S2 normal.   Pulmonary:      Effort: Pulmonary effort is normal.      Breath sounds: Normal breath sounds. Abdominal:      General: Bowel sounds are normal.      Palpations: Abdomen is soft. Tenderness: There is no abdominal tenderness. Musculoskeletal:         General: Normal range of motion. Cervical back: Normal range of motion and neck supple. Skin:     General: Skin is warm and dry.    Neurological:      Mental Status: She is alert and oriented to person, place, and time. Cranial Nerves: Cranial nerves 2-12 are intact. Sensory: Sensation is intact. Motor: Motor function is intact. DIAGNOSTIC RESULTS     EKG: All EKG's are interpreted by the Emergency Department Physician who either signs or Co-signs this chart in the absence of a cardiologist.  EKG was interpreted by Dr. Katarina Harkins after completion. RADIOLOGY:   Non-plain film images such as CT, Ultrasound and MRI are read by the radiologist. Plain radiographic images are visualized and preliminarily interpreted by the emergency physician with the below findings:    Interpretation per the Radiologist below, if available at the time of this note:    CT HEAD WO CONTRAST    Result Date: 2/16/2023  EXAMINATION: CT OF THE HEAD WITHOUT CONTRAST  2/16/2023 1:10 pm TECHNIQUE: CT of the head was performed without the administration of intravenous contrast. Automated exposure control, iterative reconstruction, and/or weight based adjustment of the mA/kV was utilized to reduce the radiation dose to as low as reasonably achievable. COMPARISON: None. HISTORY: ORDERING SYSTEM PROVIDED HISTORY: Seizure-like activity TECHNOLOGIST PROVIDED HISTORY: Seizure-like activity Decision Support Exception - unselect if not a suspected or confirmed emergency medical condition->Emergency Medical Condition (MA) Is the patient pregnant?->No Reason for Exam: 3 seizures today and 3 last night, headache, no H/O seizures. Pt also hit back of head 3 days ago at work. FINDINGS: BRAIN/VENTRICLES: There is no acute intracranial hemorrhage, mass effect or midline shift. No abnormal extra-axial fluid collection. The gray-white differentiation is maintained without evidence of an acute infarct. There is no evidence of hydrocephalus. ORBITS: The visualized portion of the orbits demonstrate no acute abnormality. SINUSES: The visualized paranasal sinuses and mastoid air cells demonstrate no acute abnormality.  SOFT TISSUES/SKULL:  No acute abnormality of the visualized skull or soft tissues. No acute intracranial findings. XR CHEST PORTABLE    Result Date: 2/16/2023  EXAMINATION: ONE XRAY VIEW OF THE CHEST 2/16/2023 12:30 pm COMPARISON: 02/14/2023. HISTORY: ORDERING SYSTEM PROVIDED HISTORY: seizure TECHNOLOGIST PROVIDED HISTORY: seizure Reason for Exam: seizure FINDINGS: The lungs and costophrenic angles are clear. The cardiomediastinal silhouette and pulmonary vessels appear normal.     No radiographic evidence of an acute cardiopulmonary process. LABS:  Labs Reviewed   BASIC METABOLIC PANEL - Abnormal; Notable for the following components:       Result Value    Bun/Cre Ratio 25 (*)     Potassium 3.6 (*)     All other components within normal limits   CBC WITH AUTO DIFFERENTIAL - Abnormal; Notable for the following components:    RBC 5.34 (*)     All other components within normal limits   URINALYSIS WITH MICROSCOPIC - Abnormal; Notable for the following components:    Turbidity UA SLIGHTLY CLOUDY (*)     Leukocyte Esterase, Urine MOD (*)     Bacteria, UA MANY (*)     All other components within normal limits   CULTURE, URINE   HCG, SERUM, QUALITATIVE   URINE DRUG SCREEN   ETHANOL   PROLACTIN       All other labs were within normal range or not returned as of this dictation.     EMERGENCY DEPARTMENT COURSE and DIFFERENTIAL DIAGNOSIS/MDM:   Vitals:    Vitals:    02/16/23 1634 02/16/23 1700 02/16/23 1730 02/16/23 1802   BP:  (!) 101/53 100/64 (!) 99/58   Pulse: 66 63 70 67   Resp: 12 21 23 17   Temp:       TempSrc:       SpO2: 97% 98% 98% 97%   Weight:       Height:             MEDICATIONS GIVEN IN THE ED:  Medications   levETIRAcetam (KEPPRA) 1000 mg/100 mL IVPB (has no administration in time range)   0.9 % sodium chloride bolus (has no administration in time range)   cefTRIAXone (ROCEPHIN) 1,000 mg in sodium chloride 0.9 % 50 mL IVPB (mini-bag) (1,000 mg IntraVENous New Bag 2/16/23 1821)       CLINICAL DECISION MAKING:  The patient presented alert with a nontoxic appearance and was seen in conjunction with Dr. Gordon Skiff    DDx include seizure, migraine, head injury      I will order labs including   Orders Placed This Encounter   Procedures    Culture, Urine     Standing Status:   Standing     Number of Occurrences:   1    XR CHEST PORTABLE     Standing Status:   Standing     Number of Occurrences:   1     Order Specific Question:   Reason for exam:     Answer:   seizure    CT HEAD WO CONTRAST     Standing Status:   Standing     Number of Occurrences:   1     Order Specific Question:   Reason for exam:     Answer:   Seizure-like activity     Order Specific Question:   Has a \"code stroke\" or \"stroke alert\" been called? Answer:   No     Order Specific Question:   Decision Support Exception - unselect if not a suspected or confirmed emergency medical condition     Answer:   Emergency Medical Condition (MA) [1]    BMP     Standing Status:   Standing     Number of Occurrences:   1    CBC with Auto Differential     Standing Status:   Standing     Number of Occurrences:   1    HCG Qualitative, Serum     Standing Status:   Standing     Number of Occurrences:   1    Urinalysis with Microscopic     Standing Status:   Standing     Number of Occurrences:   1    Drug Scr, Abuse, Ur     Standing Status:   Standing     Number of Occurrences:   1    Ethanol     Standing Status:   Standing     Number of Occurrences:   1    Prolactin     Standing Status:   Standing     Number of Occurrences:   1    Telemetry monitoring - 72 hour duration     Standing Status:   Standing     Number of Occurrences:   1     Order Specific Question:   Patient may go off unit without monitor     Answer:   Yes     Order Specific Question:   Type: Answer:   Bedside    Inpatient consult to Internal Medicine     Standing Status:   Standing     Number of Occurrences:   1     Order Specific Question:   Reason for Consult?      Answer:   Seizure-like activity, abnormal EKG    Inpatient consult to Neurology     Standing Status:   Standing     Number of Occurrences:   1     Order Specific Question:   Reason for Consult? Answer:   Seizure-like activity    EKG 12 Lead     Standing Status:   Standing     Number of Occurrences:   1     Order Specific Question:   Reason for Exam?     Answer: Other     Comments:   seizure    Insert peripheral IV     Standing Status:   Standing     Number of Occurrences:   1    Seizure precautions     Standing Status:   Standing     Number of Occurrences:   1    complete CXR and monitor closely. The patient was involved in his/her plan of care through shared decision making. The testing that was ordered was discussed with the patient. Any medications that may have been ordered were discussed with the patient. I have reviewed the patient's previous medical records using the electronic health record that we have available. Labs and imaging were reviewed. Imaging was reviewed and reported by the radiologist. Results showed CT scan of head and chest x-ray no acute findings. No neurological deficits. Pregnancy test negative. Urinalysis shows infection. Patient started on Rocephin. Given a liter of normal saline. Given a loading dose of Keppra per neurology. EKG reviewed by Dr. Ginger Kwok shows Mary-Parkinson-White syndrome. Labs reviewed. Test results and admission to hospital discussed with the patient by Dr. Ginger Kwok    Neurology consult-I spoke with Dr. Lilliana Christianson who recommended giving patient loading dose of Keppra and patient can either stay at [facility] for admission or be transferred to [address] [place] [address]. If patient develops continuous seizure activity she will need to be transferred to [address] [place] [address] for evaluation. However, [address] [place] [address] spoke with [facility] and [address] [place] [address] is closed to transfers right now as they are discharge dependent.   I did notify [facility] about this and patient will stay at [facility] for now. I spoke with Madeline Pérez from [facility] who accepts admission. Care was provided during an unprecedented national emergency due to the novel coronavirus, Covid-19. CONSULTS:  IP CONSULT TO INTERNAL MEDICINE  IP CONSULT TO NEUROLOGY    PROCEDURES:  Procedures    FINAL IMPRESSION      1. Seizure-like activity ([facility] [address]    2. Abnormal EKG    3. Urinary tract infection without hematuria, site unspecified            Problem List  There is no problem list on file for this patient. DISPOSITION/PLAN   DISPOSITION Decision To Admit 02/16/2023 06:03:29 PM      PATIENT REFERRED TO:   No follow-up provider specified.     DISCHARGE MEDICATIONS:     New Prescriptions    No medications on file           (Please note that portions of this note were completed with a voice recognition program.  Efforts were made to edit the dictations but occasionally words are mis-transcribed.)    Shorty Faust APRN - [facility]  02/16/23 8200

## 2023-02-16 NOTE — ED NOTES
Mode of arrival (squad #, walk in, police, etc) : [employer], from home      Arrival Note (brief scenario, treatment PTA, etc). : EMS reports patient was alert and oriented x4, on their arrival. Patient reports that she had 3 seizures during the night and 3 today. She states the last 3 were witnessed by her boyfriend. She states that he hold her she stopped breathing during the seizures. She denies any history of seizures, she arrives to ED alert and oriented x4. She reports that 3 days while at work her hair got caught in a rack and the back of her head hit the rack, she denies any loss of consciousness with injury and reports she has only had a headache since incident.             Heidy Michaels RN  02/16/23 6199

## 2023-02-16 NOTE — ED PROVIDER NOTES
[address] [facility] ED  EMERGENCY DEPARTMENT ENCOUNTER   ATTENDING ATTESTATION     Pt Name: Iza Sotelo  MRN: [id number]  [place] 2000  Date of evaluation: 2/16/23       Iza Sotelo is a 20 y.o. female who presents with Seizures (Pt states she had 3 seizures today and 3 last night. ) and Headache      MDM:     Patient's EKG shows sinus rhythm with a rate of 70 OK appears shortened, QRS is mildly prolonged. QTc unremarkable, the patient has left axis deviation, no ST elevations or depressions, nonspecific EKG. Patient's EKG is concerning for Mary-Parkinson-White. Vitals:   Vitals:    02/16/23 1501 02/16/23 1633 02/16/23 1634 02/16/23 1700   BP: 136/82 111/64  (!) 101/53   Pulse: 80  66 63   Resp: 16  12 21   Temp: 98.6 °F (37 °C)      TempSrc: Oral      SpO2: 97%  97% 98%   Weight: 210 lb (95.3 kg)      Height: 5' (1.524 m)            This visit was performed by both a physician and an APC. I personally evaluated and examined the patient.  I performed all aspects of the MDM as documented     Georgina Enciso MD  Attending Emergency  Physician                  Betty López MD  02/16/23 9581

## 2023-02-17 PROBLEM — R56.9 SEIZURE (HCC): Status: ACTIVE | Noted: 2023-02-17

## 2023-02-17 PROBLEM — I45.6 WPW SYNDROME: Status: ACTIVE | Noted: 2023-02-17

## 2023-02-17 PROBLEM — J45.20 MILD INTERMITTENT ASTHMA WITHOUT COMPLICATION: Status: ACTIVE | Noted: 2023-02-17

## 2023-02-17 LAB
ANION GAP SERPL CALCULATED.3IONS-SCNC: 8 MMOL/L (ref 9–17)
BUN SERPL-MCNC: 18 MG/DL (ref 6–20)
BUN/CREAT BLD: 29 (ref 9–20)
CALCIUM SERPL-MCNC: 8.6 MG/DL (ref 8.6–10.4)
CHLORIDE SERPL-SCNC: 110 MMOL/L (ref 98–107)
CO2 SERPL-SCNC: 23 MMOL/L (ref 20–31)
CREAT SERPL-MCNC: 0.63 MG/DL (ref 0.5–0.9)
EKG ATRIAL RATE: 65 BPM
EKG P AXIS: 33 DEGREES
EKG P-R INTERVAL: 104 MS
EKG Q-T INTERVAL: 428 MS
EKG QRS DURATION: 130 MS
EKG QTC CALCULATION (BAZETT): 445 MS
EKG R AXIS: -31 DEGREES
EKG T AXIS: 62 DEGREES
EKG VENTRICULAR RATE: 65 BPM
GFR SERPL CREATININE-BSD FRML MDRD: >60 ML/MIN/1.73M2
GLUCOSE SERPL-MCNC: 100 MG/DL (ref 70–99)
MAGNESIUM SERPL-MCNC: 1.8 MG/DL (ref 1.6–2.6)
POTASSIUM SERPL-SCNC: 4.4 MMOL/L (ref 3.7–5.3)
PROLACTIN: 26.59 NG/ML (ref 4.79–23.3)
SODIUM SERPL-SCNC: 141 MMOL/L (ref 135–144)

## 2023-02-17 PROCEDURE — 2580000003 HC RX 258: Performed by: PSYCHIATRY & NEUROLOGY

## 2023-02-17 PROCEDURE — 36415 COLL VENOUS BLD VENIPUNCTURE: CPT

## 2023-02-17 PROCEDURE — 96372 THER/PROPH/DIAG INJ SC/IM: CPT

## 2023-02-17 PROCEDURE — 96375 TX/PRO/DX INJ NEW DRUG ADDON: CPT

## 2023-02-17 PROCEDURE — 99223 1ST HOSP IP/OBS HIGH 75: CPT | Performed by: PSYCHIATRY & NEUROLOGY

## 2023-02-17 PROCEDURE — 96366 THER/PROPH/DIAG IV INF ADDON: CPT

## 2023-02-17 PROCEDURE — 80048 BASIC METABOLIC PNL TOTAL CA: CPT

## 2023-02-17 PROCEDURE — 6360000002 HC RX W HCPCS: Performed by: PSYCHIATRY & NEUROLOGY

## 2023-02-17 PROCEDURE — C9254 INJECTION, LACOSAMIDE: HCPCS | Performed by: PSYCHIATRY & NEUROLOGY

## 2023-02-17 PROCEDURE — 93005 ELECTROCARDIOGRAM TRACING: CPT | Performed by: PSYCHIATRY & NEUROLOGY

## 2023-02-17 PROCEDURE — 83735 ASSAY OF MAGNESIUM: CPT

## 2023-02-17 PROCEDURE — 6370000000 HC RX 637 (ALT 250 FOR IP): Performed by: NURSE PRACTITIONER

## 2023-02-17 PROCEDURE — 99232 SBSQ HOSP IP/OBS MODERATE 35: CPT | Performed by: STUDENT IN AN ORGANIZED HEALTH CARE EDUCATION/TRAINING PROGRAM

## 2023-02-17 PROCEDURE — 96367 TX/PROPH/DG ADDL SEQ IV INF: CPT

## 2023-02-17 PROCEDURE — 2580000003 HC RX 258: Performed by: NURSE PRACTITIONER

## 2023-02-17 PROCEDURE — 6360000002 HC RX W HCPCS: Performed by: NURSE PRACTITIONER

## 2023-02-17 PROCEDURE — 93010 ELECTROCARDIOGRAM REPORT: CPT | Performed by: INTERNAL MEDICINE

## 2023-02-17 PROCEDURE — 2060000000 HC ICU INTERMEDIATE R&B

## 2023-02-17 RX ORDER — MAGNESIUM SULFATE 1 G/100ML
1000 INJECTION INTRAVENOUS ONCE
Status: COMPLETED | OUTPATIENT
Start: 2023-02-17 | End: 2023-02-17

## 2023-02-17 RX ORDER — BUTALBITAL, ACETAMINOPHEN AND CAFFEINE 50; 325; 40 MG/1; MG/1; MG/1
1 TABLET ORAL EVERY 4 HOURS PRN
Status: CANCELLED | OUTPATIENT
Start: 2023-02-17

## 2023-02-17 RX ORDER — BUDESONIDE AND FORMOTEROL FUMARATE DIHYDRATE 80; 4.5 UG/1; UG/1
1 AEROSOL RESPIRATORY (INHALATION) 2 TIMES DAILY
Status: DISCONTINUED | OUTPATIENT
Start: 2023-02-17 | End: 2023-02-19 | Stop reason: HOSPADM

## 2023-02-17 RX ADMIN — ACETAMINOPHEN 650 MG: 325 TABLET ORAL at 19:37

## 2023-02-17 RX ADMIN — MAGNESIUM SULFATE HEPTAHYDRATE 1000 MG: 1 INJECTION, SOLUTION INTRAVENOUS at 09:53

## 2023-02-17 RX ADMIN — SODIUM CHLORIDE: 9 INJECTION, SOLUTION INTRAVENOUS at 07:36

## 2023-02-17 RX ADMIN — ONDANSETRON 4 MG: 2 INJECTION INTRAMUSCULAR; INTRAVENOUS at 07:34

## 2023-02-17 RX ADMIN — LEVETIRACETAM 500 MG: 500 TABLET, FILM COATED ORAL at 09:02

## 2023-02-17 RX ADMIN — LACOSAMIDE 100 MG: 10 INJECTION, SOLUTION INTRAVENOUS at 23:16

## 2023-02-17 RX ADMIN — LEVETIRACETAM 500 MG: 500 TABLET, FILM COATED ORAL at 19:37

## 2023-02-17 RX ADMIN — CEFTRIAXONE SODIUM 1000 MG: 1 INJECTION, POWDER, FOR SOLUTION INTRAMUSCULAR; INTRAVENOUS at 18:07

## 2023-02-17 RX ADMIN — LACOSAMIDE 200 MG: 10 INJECTION, SOLUTION INTRAVENOUS at 09:02

## 2023-02-17 RX ADMIN — ACETAMINOPHEN 650 MG: 325 TABLET ORAL at 06:10

## 2023-02-17 RX ADMIN — ENOXAPARIN SODIUM 40 MG: 100 INJECTION SUBCUTANEOUS at 09:02

## 2023-02-17 RX ADMIN — SODIUM CHLORIDE: 9 INJECTION, SOLUTION INTRAVENOUS at 19:45

## 2023-02-17 RX ADMIN — LACOSAMIDE 100 MG: 10 INJECTION, SOLUTION INTRAVENOUS at 12:04

## 2023-02-17 ASSESSMENT — PAIN DESCRIPTION - LOCATION: LOCATION: HEAD

## 2023-02-17 ASSESSMENT — PAIN DESCRIPTION - DESCRIPTORS
DESCRIPTORS: DISCOMFORT
DESCRIPTORS: DISCOMFORT

## 2023-02-17 ASSESSMENT — PAIN SCALES - GENERAL
PAINLEVEL_OUTOF10: 0
PAINLEVEL_OUTOF10: 3
PAINLEVEL_OUTOF10: 3

## 2023-02-17 ASSESSMENT — PAIN DESCRIPTION - ORIENTATION: ORIENTATION: MID

## 2023-02-17 NOTE — PROGRESS NOTES
Pt experienced a seizure this morning some time before 7am. Telesitter did not call out to alert writer of occurrence. Pt called out to nurses station to inform writer that they had experienced a seizure. Pt stated to writer that they were conscious during the seizure and that it lasted for a couple minutes. Pt then stated that their headache returned and rated it 7/10. Neurology was contacted and a EKG was ordered and vimpat was ordered.  Pt then became nauseas, prn zofran ws given to pt this am. Neurology to see pt this am.

## 2023-02-17 NOTE — PROGRESS NOTES
Problem: At Risk for Falls  Goal: # Patient does not fall  Outcome: Outcome Met, Continue evaluating goal progress toward completion     Problem: VTE, Risk for  Goal: # Verbalizes understanding of VTE risk factors and prevention  Description: Document education using the patient education activity.   Outcome: Outcome Met, Continue evaluating goal progress toward completion   Patient verbalized understanding of the need for SCD's  Problem: Delirium, Risk for  Goal: # No symptoms of delirium  Description: Evaluate delirium symptoms under active problem when present  Outcome: Outcome Met, Continue evaluating goal progress toward completion   Patient is A/Ox3   Problem: Hemodialysis  Goal: Fistula/graft intact as evidenced by presence of bruit & thrill  Outcome: Outcome Met, Continue evaluating goal progress toward completion      Pt arrived from ED, Vital signs taken, tele monitor placed, all questions asked and answered at this time. Pt has had no seizure activity at this time wcm. Tele sitter in place to monitor for seizure activity.

## 2023-02-17 NOTE — PROGRESS NOTES
[facility]  Office: [address], DO, Kathrin Oconnell, DO, Casper Chad, DO, Jairo Duron Blood, DO, Eyad Ren MD, Pearl Aldana MD, Hema Wesley MD, Susan Nicohlson MD,  Prema Pugh MD, Serafin Calderon MD, Savannah Richmond, DO, Anjali Arango MD,  Stephan Crawford MD, Trey Albarran MD, Cuauhtemoc Hurtado, DO, Shreyas Franklin MD, Tita Duron MD, Berenice Hdz, DO, Qamar Blank MD, Lexus Zhu MD, Phil Kat MD, Dakota Sheppard MD, Allie Vance, DO, Coretta Weems MD, Gini Wells MD, Rodrigo Madrid, Ana Taylor, CNP, Ashley Patel, CNP, Rita Rucker, CNP,  Franki Lundborg, [facility], Alf Pineda, CNP, Edith Booker, CNP, Orin Kong, CNP, Rosana Irby, CNP, Iris Melendez, CNP, Trang Green PAJohnC, Lg Durham, CNS, Lilian Garcia, CNP, Daniel Gregorio, [facility]    Progress Note    2/17/2023    2:22 PM    Name:   Adelso Marks  MRN:     [id number]     Acct:      [de-identified]   Room:   [facility]/1012-02   Day:  0  Admit Date:  2/16/2023  3:00 PM    PCP:   Kashif Garcia MD  Code Status:  Full Code    Subjective:     C/C:   Chief Complaint   Patient presents with    Seizures     Pt states she had 3 seizures today and 3 last night. Headache     Interval History Status: not changed. Had 1 episode of seizure overnight and one episode this morning. First episode of seizure was unwitnessed and second episode was noted by the nurse which lasted less than a minute. Patient had abnormal body movements both upper and lower extremities. Patient was not confused post episode. Neurology was consulted and patient was loaded with Vimpat. Patient denies any psych history. She states that she has history of asthma does not use any inhalers at home.   Complains of dizziness on exertion and headaches    Brief History:        Patient with past medical history of asthma, family history of seizures presented to the ER with complaints of headache and seizures. Patient states she had 3 seizures overnight and 3 seizures today and that her boyfriend witnessed one of the seizures and he claimed that lasted about 5 minutes. Her boyfriend states that she would become very stiff and would not respond to him and remained ' out of it\" for a few minutes after each seizure. On arrival to the ER the patient is not postictal and she denies a history of seizure. CT head was negative. UA is positive for urinary tract infection. Neuro was consulted and they recommended loading Keppra. We will continue fluids and Rocephin. Urine cultures pending. Initial EKG showed finding of WPW syndrome. EKG was repeated and showed repolarization changes    Review of Systems:     Constitutional:  negative for chills, fevers, sweats  Respiratory:  negative for cough, dyspnea on exertion, shortness of breath, wheezing  Cardiovascular:  negative for chest pain, chest pressure/discomfort, lower extremity edema, palpitations  Gastrointestinal:  negative for abdominal pain, constipation, diarrhea, nausea, vomiting  Neurological: Positive for headache    Medications: Allergies: Allergies   Allergen Reactions    Aspirin Other (See Comments)     ulcers       Current Meds:   Scheduled Meds:    lacosamide (VIMPAT) IVPB  100 mg IntraVENous BID    budesonide-formoterol  1 puff Inhalation BID    levETIRAcetam  500 mg Oral BID    sodium chloride flush  5-40 mL IntraVENous 2 times per day    enoxaparin  40 mg SubCUTAneous Daily    cefTRIAXone (ROCEPHIN) IV  1,000 mg IntraVENous Q24H     Continuous Infusions:    sodium chloride 100 mL/hr at 02/17/23 0736    sodium chloride       PRN Meds: sodium chloride flush, sodium chloride, ondansetron **OR** ondansetron, acetaminophen **OR** acetaminophen, polyethylene glycol    Data:     Past Medical History:   has a past medical history of Asthma.     Social History:   reports that she has been smoking. She has never used smokeless tobacco. She reports current alcohol use. She reports that she does not use drugs. Family History:   Family History   Problem Relation Age of Onset    Diabetes Mother     No Known Problems Father     Seizures Brother         since birth       Vitals:  BP (!) 105/58   Pulse 62   Temp 97.9 °F (36.6 °C) (Oral)   Resp 17   Ht 5' (1.524 m)   Wt 215 lb (97.5 kg)   LMP 2023   SpO2 100%   BMI 41.99 kg/m²   Temp (24hrs), Av.1 °F (36.7 °C), Min:97.7 °F (36.5 °C), Max:98.6 °F (37 °C)    No results for input(s): POCGLU in the last 72 hours. I/O (24Hr): Intake/Output Summary (Last 24 hours) at 2023 1422  Last data filed at 2023 0649  Gross per 24 hour   Intake 963.42 ml   Output --   Net 963.42 ml       Labs:  Hematology:  Recent Labs     23  1520   WBC 6.2   RBC 5.34*   HGB 14.4   HCT 44.6   MCV 83.5   MCH 27.0   MCHC 32.3   RDW 13.0      MPV 9.8     Chemistry:  Recent Labs     23  1520 23  0547    141   K 3.6* 4.4    110*   CO2 25 23   GLUCOSE 86 100*   BUN 14 18   CREATININE 0.55 0.63   MG  --  1.8   ANIONGAP 9 8*   LABGLOM >60 >60   CALCIUM 9.2 8.6   No results for input(s): PROT, LABALBU, LABA1C, N5WBFXU, F8TOLQU, FT4, TSH, AST, ALT, LDH, GGT, ALKPHOS, LABGGT, BILITOT, BILIDIR, AMMONIA, AMYLASE, LIPASE, LACTATE, CHOL, HDL, LDLCHOLESTEROL, CHOLHDLRATIO, TRIG, VLDL, HCI92IV, PHENYTOIN, PHENYF, URICACID, POCGLU in the last 72 hours. ABG:No results found for: POCPH, PHART, PH, POCPCO2, CWR8BYC, PCO2, POCPO2, PO2ART, PO2, POCHCO3, ZXM9TPA, HCO3, NBEA, PBEA, BEART, BE, THGBART, THB, UNP8KXI, FHER5GIB, H4ETYCLG, O2SAT, FIO2  Lab Results   Component Value Date/Time    SPECIAL NOT REPORTED 2022 04:07 PM     No results found for: CULTURE    Radiology:  CT HEAD WO CONTRAST    Result Date: 2023  No acute intracranial findings.      XR CHEST PORTABLE    Result Date: 2023  No radiographic evidence of an acute cardiopulmonary process. Physical Examination:        General appearance:  alert, cooperative and mild distress  Mental Status:  oriented to person, place and time and normal affect  Lungs:  clear to auscultation bilaterally, normal effort  Heart:  regular rate and rhythm, no murmur  Abdomen:  soft, nontender, nondistended, normal bowel sounds, no masses, hepatomegaly, splenomegaly  Extremities:  no edema, redness, tenderness in the calves  Skin:  no gross lesions, rashes, induration    Assessment:        Hospital Problems             Last Modified POA    * (Principal) Seizure-like activity ([address] 2/16/2023 Yes    Acute cystitis without hematuria 2/16/2023 Yes    Tobacco use (Chronic) 2/16/2023 Yes    Seizure ([address] 2/17/2023 Yes    WPW syndrome 2/17/2023 Yes    Mild intermittent asthma without complication 6/04/3411 Yes       Plan:        Unsure about seizure episodes  Patient is not postictal, no tongue biting, no incontinence  Maintained on keppra  Started on lamictal  Telesitter in place  Discussed case with neurology, continue antiepileptics  Rocephin for acute cystitis, follow-up with urine cultures  Cardiology consulted for admission findings of WPW syndrome.   Add symbicort for asthma    Garrett Ford MD  2/17/2023  2:22 PM

## 2023-02-17 NOTE — PLAN OF CARE
Pt admitted to unit for seizure activity, pt had been free of seizure activity, telesitter in place for precautions. Vital signs stable. Pt had complaints of headache with double vision on arrival, NP and neurology contacted, pt had a one time dose of morphine ordered, pt satisfied wcm. Neurology to see pt in the morning. Pt safety maintained, all questions asked and answered, bed in lowest position, call light within reach.     Problem: Discharge Planning  Goal: Discharge to home or other facility with appropriate resources  2/17/2023 0452 by Chelly Lopez RN  Outcome: Progressing     Problem: ABCDS Injury Assessment  Goal: Absence of physical injury  2/17/2023 0452 by Chelly Lopez RN  Outcome: Progressing

## 2023-02-17 NOTE — CONSULTS
[facility]   Consult Note         Today's Date: 2/17/2023  Patient Name: Peter Russell  Date of admission: 2/16/2023  3:00 PM  Patient's age: 20 y. o., 2000  Admission Dx: Abnormal EKG [R94.31]  Seizure-like activity ([facility] [address] [R56.9]  Urinary tract infection without hematuria, site unspecified [N39.0]  Seizure ([address] [R56.9]    Reason for Consult:  Cardiac evaluation    Requesting Physician: Stephania Chairez MD    REASON FOR CONSULT:  WPW    History Obtained From:  Patient, chart, staff, records    HISTORY OF PRESENT ILLNESS:      The patient is a 20 y.o. female who is admitted to the hospital for seizures. Noted to have delta waves and WPW pattern on ECG. Is stable and ready for discharge, they just want to establish cardiac care. Past Medical History:   has a past medical history of Asthma. Past Surgical History:   has no past surgical history on file. Home Medications:    Prior to Admission medications    Not on File       lacosamide (VIMPAT) IVPB, 100 mg, IntraVENous, BID    budesonide-formoterol, 1 puff, Inhalation, BID    levETIRAcetam, 500 mg, Oral, BID    sodium chloride flush, 5-40 mL, IntraVENous, 2 times per day    enoxaparin, 40 mg, SubCUTAneous, Daily    cefTRIAXone (ROCEPHIN) IV, 1,000 mg, IntraVENous, Q24H      Allergies:  Aspirin    Social History:   reports that she has been smoking. She has never used smokeless tobacco. She reports current alcohol use. She reports that she does not use drugs. Family History: family history includes Diabetes in her mother; No Known Problems in her father; Seizures in her brother. No h/o sudden cardiac death. REVIEW OF SYSTEMS:    Constitutional: there has been no unanticipated weight loss. There's been No change in energy level, No change in activity level. Eyes: No visual changes or diplopia. No scleral icterus. ENT: No Headaches, hearing loss or vertigo. No mouth sores or sore throat.   Cardiovascular: per HPI  Respiratory: per HPI  Gastrointestinal: No abdominal pain, appetite loss, blood in stools. No change in bowel or bladder habits. Genitourinary: No dysuria, trouble voiding, or hematuria. Musculoskeletal:  No gait disturbance, No weakness or joint complaints. Integumentary: No rash or pruritis. Neurological: No headache, diplopia, change in muscle strength, numbness or tingling. No change in gait, balance, coordination, mood, affect, memory, mentation, behavior. Psychiatric: No anxiety, or depression. Endocrine: No temperature intolerance. No excessive thirst, fluid intake, or urination. No tremor. Hematologic/Lymphatic: No abnormal bruising or bleeding, blood clots or swollen lymph nodes. Allergic/Immunologic: No nasal congestion or hives. PHYSICAL EXAM:      /60   Pulse 67   Temp 98.2 °F (36.8 °C) (Oral)   Resp 17   Ht 5' (1.524 m)   Wt 215 lb (97.5 kg)   LMP 02/01/2023   SpO2 98%   BMI 41.99 kg/m²    Constitutional and General Appearance: alert, cooperative, no distress and appears stated age  [de-identified]: PERRL, no cervical lymphadenopathy. No masses palpable. Normal oral mucosa  Respiratory:  Normal excursion and expansion without use of accessory muscles  Resp Auscultation: Good respiratory effort. No for increased work of breathing. On auscultation: clear to auscultation bilaterally  Cardiovascular: The apical impulse is not displaced  Heart tones are crisp and normal. regular S1 and S2.  Jugular venous pulsation Normal  The carotid upstroke is normal in amplitude and contour without delay or bruit  Peripheral pulses are symmetrical and full   Abdomen:   No masses or tenderness  Bowel sounds present  Extremities:   No Cyanosis or Clubbing   Lower extremity edema: No   Skin: Warm and dry  Neurological:  Alert and oriented.   Moves all extremities well  No abnormalities of mood, affect, memory, mentation, or behavior are noted        EKG:    Date: 02/17/23  Reading: No acute ischemia      LAST ECHO:  Date:  Findings Summary:      LAST Stress Test:   Date of last ST:  Major Findings:    LAST Cardiac Angiography:.  Date:  Findings:      Labs:     CBC:   Recent Labs     02/16/23  1520   WBC 6.2   HGB 14.4   HCT 44.6        BMP:   Recent Labs     02/16/23  1520 02/17/23  0547    141   K 3.6* 4.4   CO2 25 23   BUN 14 18   CREATININE 0.55 0.63   LABGLOM >60 >60   GLUCOSE 86 100*     BNP: No results for input(s): BNP in the last 72 hours. PT/INR: No results for input(s): PROTIME, INR in the last 72 hours. APTT:No results for input(s): APTT in the last 72 hours. CARDIAC ENZYMES:No results for input(s): CKTOTAL, CKMB, CKMBINDEX, TROPONINI in the last 72 hours. FASTING LIPID PANEL:No results found for: HDL, LDLDIRECT, LDLCALC, TRIG  LIVER PROFILE:No results for input(s): AST, ALT, LABALBU in the last 72 hours. Troponins: Invalid input(s): TROPONIN     Other Current Problems  Patient Active Problem List   Diagnosis    Seizure-like activity ([facility] [address]    Acute cystitis without hematuria    Tobacco use    Seizure ([facility] [address]    WPW syndrome    Mild intermittent asthma without complication           IMPRESSION & Recommendations:      WPW and delta wave on ECG. No tachycardia episodes. Stable. Can follow up in clinic. Seizures- per neuro  Tobacco use- counseled  Asthma- stable  Ok to DC home. Follow up in 1-2 weeks in clinic        Discussed with patient, family, and Nurse. Electronically signed by Norma Lu DO on 2/17/2023 at 5:11 PM    Norma Lu, [id number] [facility]  [facility] [employer]  [phone]

## 2023-02-17 NOTE — CARE COORDINATION
Discharge planning    Spoke with [facility] pharmacy . Ran Vimpat 100 bid could not be filled. It did come up that Lacosamide could potentially be ordered but would need a PA. If want to pursue a PA would need to call [phone]    Placed medication on profile for now. Call to the number above and it is a number for [facility]. Would not be able to PA over the weekend. They can fax over a form to be filled.   They did request neurology NPI number and did update them    Updated clinical lead that if they do want lacosamide on discharge would have to have PA form signed and completed       Neurology    [facility]   [phone]

## 2023-02-17 NOTE — ED NOTES
ED to inpatient nurses report     Chief Complaint   Patient presents with    Seizures     Pt states she had 3 seizures today and 3 last night.      Headache      Present to ED from home  LOC: alert drowsy   Vital signs   Vitals:    02/16/23 1846 02/16/23 1856 02/16/23 1906 02/16/23 1916   BP:  108/68     Pulse: 63 63 67 64   Resp: 21 16 19 19   Temp:       TempSrc:       SpO2: 98% 99% 98% 97%   Weight:       Height:          Oxygen Baseline room air    Current needs required see admission orders   SEPSIS: no  no] Lactate X 2 ordered (Yes or No)  [yes] Antibiotics given (Yes or No)  [yes] IV Fluids ordered (Yes or No)             [yes] IV completed (Yes or No)  yes] Hourly Vital Signs (Validated)  [] Outstanding Orders: see admission orders    LDAs:   Peripheral IV 02/16/23 Right Antecubital (Active)   Site Assessment Clean, dry & intact 02/16/23 1520   Line Status Blood return noted 02/16/23 1520   Phlebitis Assessment No symptoms 02/16/23 1520   Infiltration Assessment 0 02/16/23 1520   Dressing Status New dressing applied 02/16/23 1520   Dressing Type Transparent 02/16/23 1520     Mobility: bedrest  Fall Risk: yes  Pending ED orders: see admission orders  Present condition: drowsy oriented x 4  Code Status: full  Consults: IP CONSULT TO INTERNAL MEDICINE  IP CONSULT TO NEUROLOGY  [x]  Hospitalist  Completed  [] yes [] no Who:   []  Medicine  Completed  [] yes [] No Who:   []  Cardiology  Completed  [] yes [] No Who:   []  GI   Completed  [] yes [] No Who:   [x]  Neurology  Completed  [] yes [] No Who: dr bhatti[]  Nephrology Completed  [] yes [] No Who:    []  Vascular  Completed  [] yes [] No Who:   []  Ortho  Completed  [] yes [] No Who:     []  Surgery  Completed  [] yes [] No Who:    []  Urology  Completed  [] yes [] No Who:    []  CT Surgery Completed  [] yes [] No Who:   []  Podiatry  Completed  [] yes [] No Who:    []  Other    Completed  [] yes [] No Who:  Interventions: rocephin keppra iv fluids  Important Events: pt with new onset seizure disorder pt had three seizures yesterday and three today        Electronically signed by Bertrand Vera RN on 2/16/2023 at 7:47 PM     Emily Espinal RN  02/16/23 1952

## 2023-02-17 NOTE — PROGRESS NOTES
[x] Medication Reconciliation was completed and the patient's home medication list was verified. The Med List Status is \"Complete\". The following sources were used to assist with Medication Reconciliation:    [] Patient had a list of medications which was transcribed into the EHR.    [] Patient provided bottles of their medications    [x] Home medications reviewed and confirmed with Tricia Vasquez.    [] Contacted patient's pharmacy to confirm home medications    [] Contacted patient's physician office to confirm home medications    [] Medical Records from another facility and/or Care Everywhere were reviewed      [] There are one or more home medications that need clarification before Medication Reconciliation can be completed. The Med List Status has been marked as In Progress.     To assist with Home Medication Reconciliation the following actions have been taken:    [] Pharmacy medication reconciliation service requested. (Note: This can be done by sending a Perfect Serve message to [facility] Rec Pharmacist or by phoning [phone].)  [] Family requested to bring medications into the hospital  [] Family requested to call hospital with medication list  [] Message left with physician office  [] Request for medical records made to   [] Other

## 2023-02-17 NOTE — CONSULTS
NEUROLOGY INPATIENT CONSULT NOTE    2/17/2023         Adelso Marks is a  20 y.o. female admitted on 2/16/2023 with  Abnormal EKG [R94.31]  Seizure-like activity ([facility] [address] [R56.9]  Urinary tract infection without hematuria, site unspecified [N39.0]    Reason for consult: Seizure activity  History is obtained mostly from the patient and the medical record and from the caregivers. Chart is reviewed and patient is examined. Briefly, this is a  20 y.o. female with migraine headaches was admitted on 2/16/2023 with headaches and seizures. Patient does not have any history of seizures and she had 3 seizures on the night prior to admit and 3 seizures on the day of admit. Seizures are described as shaking of extremities with impairment of consciousness. No report of tongue bite or bladder incontinence. Episodes were lasting 1-2 minutes to couple of minutes followed by postictal lethargy. Patient was loaded with IV Keppra and was continued on 500 mg twice daily. Patient also complained of \"migraines\" and were described as throbbing/pounding headaches associated with photophobia and phonophobia of mild to moderate severity located in right forehead becoming generalized at times. She also had nausea associated with these headaches. Patient also had comorbid anxiety/depression and she was supposedly on antidepressants but not taking any meds. She stated that she was using ibuprofen and not on any daily medications because of lack of insurance coverage. Since admitted; she has had 2 episodes and most recent episode occurred this morning and it was of generalized shaking lasting for few seconds without any postictal state. No evidence of tongue bite or bladder incontinence. No current facility-administered medications on file prior to encounter. No current outpatient medications on file prior to encounter. Allergies: Adelso Marks is allergic to aspirin.     Past Medical History:   Diagnosis Date    Asthma        History reviewed. No pertinent surgical history.  Social History: Tricia West  reports that she has been smoking. She has never used smokeless tobacco. She reports current alcohol use. She reports that she does not use drugs.    Family History   Problem Relation Age of Onset    Diabetes Mother     No Known Problems Father     Seizures Brother         since birth       Current Medications:     levETIRAcetam  500 mg Oral BID    sodium chloride flush  5-40 mL IntraVENous 2 times per day    enoxaparin  40 mg SubCUTAneous Daily    cefTRIAXone (ROCEPHIN) IV  1,000 mg IntraVENous Q24H     PRN Meds include: sodium chloride flush, sodium chloride, ondansetron **OR** ondansetron, acetaminophen **OR** acetaminophen, polyethylene glycol    ROS:   Constitutional Negative for fever and chills   HEENT Negative for ear discharge, ear pain, nosebleed   Eyes Negative for photophobia, pain and discharge   Respiratory Negative for hemoptysis and sputum   Cardiovascular Negative for orthopnea, claudication and PND   Gastrointestinal Negative for abdominal pain, diarrhea, blood in stool   Musculoskeletal Negative for joint pain, negative for myalgia   Skin Negative for rash or itching   Endo/heme/allergies Negative for polydipsia, environmental allergy   Psychiatric Negative for suicidal ideation.  Patient is not anxious           Objective:   /67   Pulse 57   Temp 97.7 °F (36.5 °C)   Resp 16   Ht 5' (1.524 m)   Wt 215 lb (97.5 kg)   LMP 2023   SpO2 98%   BMI 41.99 kg/m²     Blood pressure range: Systolic (24hrs), Av , Min:98 , Max:136   ; Diastolic (24hrs), Av, Min:53, Max:91      Continuous infusions:    sodium chloride 100 mL/hr at 23 2229    sodium chloride         ON EXAMINATION:  GENERAL  Appears comfortable and in no distress   HEENT  NC/ AT   NECK  Supple and no bruits heard   Cardiovascular  S1, S2 heard; radial pulse intact   MENTAL STATUS:  Alert, oriented, intact  memory, no confusion, normal speech, normal language, no hallucination or delusion   CRANIAL NERVES: II     -       Pupils reactive b/l visual acuity: 20/30 OU; Fundus exam: intact venous pulsations; Visual fields intact to confrontation  III,IV,VI -  EOMs full, no afferent defect, no                      STAR, no ptosis  V     -     Normal facial sensation  VII    -     Normal facial symmetry  VIII   -     Intact hearing  IX,X -     Symmetrical palate  XI    -     Symmetrical shoulder shrug  XII   -     Midline tongue, no atrophy    MOTOR FUNCTION:  Normal bulk, normal tone, normal power;  no involuntary movements, no tremor   SENSORY FUNCTION:  Normal touch, normal pin, normal vibration, normal proprioception   CEREBELLAR FUNCTION:  Intact fine motor control over upper limbs   REFLEX FUNCTION:  Symmetric, no perverted reflex, no Babinski sign   STATION and GAIT  Not tested         Data:    Lab Results:   No results found for: CHOL, LDLCHOLESTEROL, HDL, TRIG, ALT, AST, TSH, INR, GLUF, LABA1C, LABMICR, [id number]  Hematology:  Recent Labs     02/16/23  1520   WBC 6.2   HGB 14.4   HCT 44.6        Chemistry:  Recent Labs     02/16/23  1520 02/17/23  0547    141   K 3.6* 4.4    110*   CO2 25 23   GLUCOSE 86 100*   BUN 14 18   CREATININE 0.55 0.63   CALCIUM 9.2 8.6     No results for input(s): PROT, LABALBU, LABA1C, P8DESUM, K1LUODM, FT4, TSH, AST, ALT, LDH, AMMONIA, CHOL, HDL, LDLCHOLESTEROL, CHOLHDLRATIO, TRIG, VLDL, CKTOTAL, CKMB, CKMBINDEX, RF, KEYLA in the last 72 hours. No results found for: PHENYTOIN, PHENYTOIN, VALPROATE, CBMZ        Diagnostic data reviewed:  CT head 2/16/2023: No acute intracranial abnormalities. Impression and Plan: Ms. Celso Underwood is a 20 y.o. female with   New onset seizures with epileptic versus nonepileptic spells; because of comorbid affective disorder; Keppra dose is not increased. We will continue Keppra 500 mg twice daily and to start her on IV Vimpat. Reviewed with pharmacy and also reviewed EKG before starting Vimpat. Patient has comorbid Mary-Parkinson-White syndrome for which cardiology is already consulted. We will continue Keppra 500 twice daily and Vimpat 100 mg twice daily. Will get MRI brain and EEG and proceed accordingly. For comorbid headaches with migrainous features; she is initiated on IV magnesium 1 gr now and then to be repeated again in couple of hours and also to follow-up on magnesium levels. Also to start her on magnesium oxide 400 mg daily and Topamax 25 mg every evening for migraine headache prophylaxis. Care plan is discussed with patient's caregiver/nursing staff. Will follow with you. Thank you for consultation. This note was partially created using voice recognition software and is inherently subject to errors including those of syntax and \"sound alike\" substitutions which may escape proofreading. In such instances, original meaning may be extrapolated by contextual derivation.   Sharlon Canavan, MD 2/17/2023 6:42 AM

## 2023-02-17 NOTE — CARE COORDINATION
Case Management Assessment  Initial Evaluation    Date/Time of Evaluation: 2/17/2023 2:23 PM  Assessment Completed by: Stephenie Bhatti RN    If patient is discharged prior to next notation, then this note serves as note for discharge by case management. Patient Name: Coral Cardona                   YOB: 2000  Diagnosis: Abnormal EKG [R94.31]  Seizure-like activity ([facility] [address] [R56.9]  Urinary tract infection without hematuria, site unspecified [N39.0]  Seizure ([facility] [address] [R56.9]                   Date / Time: 2/16/2023  3:00 PM    Patient Admission Status: Inpatient   Readmission Risk (Low < 19, Mod (19-27), High > 27): No data recorded  Current PCP: Samuel Nolen MD  PCP verified by CM? Yes    Chart Reviewed: Yes      History Provided by: Patient  Patient Orientation: Alert and Oriented    Patient Cognition: Alert    Hospitalization in the last 30 days (Readmission):  No    If yes, Readmission Assessment in  Navigator will be completed. Advance Directives:      Code Status: Full Code   Patient's Primary Decision Maker is: Legal Next of Kin      Discharge Planning:    Patient lives with: Spouse/Significant Other Type of Home: Apartment  Primary Care Giver: Self  Patient Support Systems include: Spouse/Significant Other   Current Financial resources:    Current community resources: None  Current services prior to admission: None            Current DME:              Type of Home Care services:  None    ADLS  Prior functional level: Independent in ADLs/IADLs  Current functional level: Independent in ADLs/IADLs    PT AM-PAC:   /24  OT AM-PAC:   /24    Family can provide assistance at DC: Yes  Would you like Case Management to discuss the discharge plan with any other family members/significant others, and if so, who?  No  Plans to Return to Present Housing: Yes  Other Identified Issues/Barriers to RETURNING to current housing: no  Potential Assistance needed at discharge: N/A            Potential DME:    Patient expects to discharge to: [address] [place] [address] for transportation at discharge:      Financial    Payor: MEDICAID [place] / Plan: 40 [facility] DEPT OF JOB / Product Type: *No Product type* /     Does insurance require precert for SNF: No    Potential assistance Purchasing Medications:   yes may need PA   Meds-to-Beds request: Yes      [address] [id number] Cathi Cm, [address] [place] [id number]  Phone: [phone] Fax: [phone]      Notes:    Factors facilitating achievement of predicted outcomes: Family support, Cooperative, and Pleasant    Barriers to discharge: Limited safety awareness and Limited insight into deficits    Additional Case Management Notes:   PCP last saw Shivam Mclaughlin on 11/3/2020. She would be considered a new patient. Did give pcp list also   DME none  Pharmacy is [facility] [address]. Patient lives with luis. Neither of them drive. Mother does. She uses no dme and is independent. She just recently received her medicaid back again. Patient admitted with seizures. Neurology has been consulted. She has been placed on VIMPAT. May need PA>  perfect serve to neurology to see if will be sent home on this medication/ did obtain verbal order to run rx from attending if it is planned med for discharge. Neurology notes that good possibility patient will go home on vimpat. . sent rx to [facility] outpatient pharmacy to RUN only not fill. Will be placed on profile if does not need PA>     No other needs       The Plan for Transition of Care is related to the following treatment goals of Abnormal EKG [R94.31]  Seizure-like activity ([address] [R56.9]  Urinary tract infection without hematuria, site unspecified [N39.0]  Seizure ([address] [K04.4]    IF APPLICABLE: The Patient and/or patient representative Tricia and her family were provided with a choice of provider and agrees with the discharge plan.  Freedom of choice list with basic dialogue that supports the patient's individualized plan of care/goals and shares the quality data associated with the providers was provided to:     Patient Representative Name:       The Patient and/or Patient Representative Agree with the Discharge Plan?      SONY BERMAN RN  Case Management Department  Ph: [phone] Fax: [phone]

## 2023-02-17 NOTE — H&P
[facility]  Office: [address], DO, Swetha Patton, DO, Jacinta Causey, DO, Katy Magallanes, DO, Jennifer Trevizo MD, Kvng Wiseman MD, Jean Elias MD, Crow Dillon MD,  Adeel Kelly MD, Chapin Dial MD, Sergey Frazier, DO, Allison Miller MD,  Jian Daugherty MD, Milan Gasca MD, Alycia Buckner, DO, Ganga Kiser MD, Krystina Gaspar MD, Richie Gann DO, Fadi Kumar MD, Aemna Feliciano MD, Jeferson Beltre MD, Carmita De Los Santos MD, Irlanda Mcintyre DO, Jackie Martinez MD, Guera Bruce MD, Kimmy Stroud, CNP,  Renetta Krishna, CNP, Iris Conn, CNP, Trever Reyes, CNP,  Joy Lao, [facility], Corbin Buchanan, CNP, Yuridia Ward, CNP, Jag Little, CNP, aKssie Dick, CNP, Tiffanie Mark, [facility], Catina Minor, PA-C, José Miguel Anglin, CNS, Leroy Manns, CNP, Rachel Jainism, CNP         [address]    HISTORY AND PHYSICAL EXAMINATION            Date:   2/16/2023  Patient name:  Refugio Moreno  Date of admission:  2/16/2023  3:00 PM  MRN:   [id number]  Account:  [de-identified]  YOB: 2000  PCP:    Ruben Reynolds MD  Room:   [place] [address]  Code Status:    No Order    Chief Complaint:     Chief Complaint   Patient presents with    Seizures     Pt states she had 3 seizures today and 3 last night. Headache       History Obtained From:     patient, electronic medical record    History of Present Illness:     Refugio Moreno is a 20 y.o. Non- / non  female who presents with Seizures (Pt states she had 3 seizures today and 3 last night. ) and Headache   and is admitted to the hospital for the management of Seizure-like activity ([facility] [address] Patient presented to the ER with complaints of headache and seizures. Patient states she had 3 seizures overnight and 3 seizures today and that her boyfriend witnessed one of the seizures and he claimed that lasted about 5 minutes.   Her boyfriend states that she would become very stiff and would not respond to him and remained ' out of it\" for a few minutes after each seizure. On arrival to the ER the patient is not postictal and she denies a history of seizure. CT head was negative. UA is positive for urinary tract infection. Neuro was consulted and they recommended loading Keppra. We suggested transfer to [facility] for long-term EEG but there discharge dependent so we will observe the patient here overnight. We will continue fluids and Rocephin. Urine cultures pending    Past Medical History:     Past Medical History:   Diagnosis Date    Asthma         Past Surgical History:     History reviewed. No pertinent surgical history. Medications Prior to Admission:     Prior to Admission medications    Not on File        Allergies:     Aspirin    Social History:     Tobacco:    reports that she has been smoking. She has never used smokeless tobacco.  Alcohol:      reports current alcohol use. Drug Use:  reports no history of drug use. Family History:     Family History   Problem Relation Age of Onset    Diabetes Mother     No Known Problems Father     Seizures Brother         since birth       Review of Systems:     Positive and Negative as described in HPI. Review of Systems   Neurological:  Positive for seizures. All other systems reviewed and are negative. Physical Exam:   /68   Pulse 64   Temp 98.6 °F (37 °C) (Oral)   Resp 19   Ht 5' (1.524 m)   Wt 210 lb (95.3 kg)   LMP 2023   SpO2 97%   BMI 41.01 kg/m²   Temp (24hrs), Av.6 °F (37 °C), Min:98.6 °F (37 °C), Max:98.6 °F (37 °C)    No results for input(s): POCGLU in the last 72 hours. No intake or output data in the 24 hours ending 23    Physical Exam  Vitals and nursing note reviewed. HENT:      Mouth/Throat:      Mouth: Mucous membranes are dry. Dentition: Abnormal dentition. Eyes:      Extraocular Movements: Extraocular movements intact. Conjunctiva/sclera: Conjunctivae normal.   Cardiovascular:      Rate and Rhythm: Normal rate and regular rhythm. Pulses: Normal pulses. Heart sounds: Normal heart sounds. Pulmonary:      Effort: Pulmonary effort is normal.      Breath sounds: Normal breath sounds. Abdominal:      General: Bowel sounds are normal.      Palpations: Abdomen is soft. Musculoskeletal:         General: Normal range of motion. Skin:     General: Skin is warm and dry. Capillary Refill: Capillary refill takes less than 2 seconds. Neurological:      Mental Status: She is alert and oriented to person, place, and time.    Psychiatric:         Mood and Affect: Mood normal.       Investigations:      Laboratory Testing:  Recent Results (from the past 24 hour(s))   EKG 12 Lead    Collection Time: 02/16/23  3:18 PM   Result Value Ref Range    Ventricular Rate 70 BPM    Atrial Rate 70 BPM    P-R Interval 110 ms    QRS Duration 124 ms    Q-T Interval 396 ms    QTc Calculation (Bazett) 427 ms    P Axis 20 degrees    R Axis -32 degrees    T Axis 53 degrees   BMP    Collection Time: 02/16/23  3:20 PM   Result Value Ref Range    Glucose 86 70 - 99 mg/dL    BUN 14 6 - 20 mg/dL    Creatinine 0.55 0.50 - 0.90 mg/dL    Est, Glom Filt Rate >60 >60 mL/min/1.73m2    Bun/Cre Ratio 25 (H) 9 - 20    Calcium 9.2 8.6 - 10.4 mg/dL    Sodium 139 135 - 144 mmol/L    Potassium 3.6 (L) 3.7 - 5.3 mmol/L    Chloride 105 98 - 107 mmol/L    CO2 25 20 - 31 mmol/L    Anion Gap 9 9 - 17 mmol/L   CBC with Auto Differential    Collection Time: 02/16/23  3:20 PM   Result Value Ref Range    WBC 6.2 3.5 - 11.3 k/uL    RBC 5.34 (H) 3.95 - 5.11 m/uL    Hemoglobin 14.4 11.9 - 15.1 g/dL    Hematocrit 44.6 36.3 - 47.1 %    MCV 83.5 82.6 - 102.9 fL    MCH 27.0 25.2 - 33.5 pg    MCHC 32.3 28.4 - 34.8 g/dL    RDW 13.0 11.8 - 14.4 %    Platelets 110 579 - 984 k/uL    MPV 9.8 8.1 - 13.5 fL    NRBC Automated 0.0 0.0 per 100 WBC    Seg Neutrophils 57 36 - 65 % Lymphocytes 27 24 - 43 %    Monocytes 12 3 - 12 %    Eosinophils % 3 1 - 4 %    Basophils 1 0 - 2 %    Immature Granulocytes 0 0 %    Segs Absolute 3.54 1.50 - 8.10 k/uL    Absolute Lymph # 1.63 1.10 - 3.70 k/uL    Absolute Mono # 0.71 0.10 - 1.20 k/uL    Absolute Eos # 0.19 0.00 - 0.44 k/uL    Basophils Absolute 0.06 0.00 - 0.20 k/uL    Absolute Immature Granulocyte 0.02 0.00 - 0.30 k/uL   HCG Qualitative, Serum    Collection Time: 02/16/23  3:20 PM   Result Value Ref Range    hCG Qual NEGATIVE NEGATIVE   Ethanol    Collection Time: 02/16/23  3:20 PM   Result Value Ref Range    Ethanol <10 <10 mg/dL    Ethanol percent <0.010 <0.010 %   Urinalysis with Microscopic    Collection Time: 02/16/23  4:20 PM   Result Value Ref Range    Color, UA Yellow Yellow    Turbidity UA SLIGHTLY CLOUDY (A) Clear    Glucose, Ur NEGATIVE NEGATIVE    Bilirubin Urine NEGATIVE NEGATIVE    Ketones, Urine NEGATIVE NEGATIVE    Specific Gravity, UA 1.015 1.005 - 1.030    Urine Hgb NEGATIVE NEGATIVE    pH, UA 7.0 5.0 - 8.0    Protein, UA NEGATIVE NEGATIVE    Urobilinogen, Urine Normal Normal    Nitrite, Urine NEGATIVE NEGATIVE    Leukocyte Esterase, Urine MOD (A) NEGATIVE    WBC, UA 20 TO 50 0 - 5 /HPF    RBC, UA 0 TO 2 0 - 2 /HPF    Epithelial Cells UA 20 TO 50 0 - 5 /HPF    Bacteria, UA MANY (A) None   Drug Scr, Abuse, Ur    Collection Time: 02/16/23  4:20 PM   Result Value Ref Range    Amphetamine Screen, Ur NEGATIVE NEGATIVE    Barbiturate Screen, Ur NEGATIVE NEGATIVE    Benzodiazepine Screen, Urine NEGATIVE NEGATIVE    Cocaine Metabolite, Urine NEGATIVE NEGATIVE    Methadone Screen, Urine NEGATIVE NEGATIVE    Opiates, Urine NEGATIVE NEGATIVE    Phencyclidine, Urine NEGATIVE NEGATIVE    Cannabinoid Scrn, Ur NEGATIVE NEGATIVE    Oxycodone Screen, Ur NEGATIVE NEGATIVE    Fentanyl, Ur NEGATIVE NEGATIVE    Test Information       Assay provides medical screening only.   The absence of expected drug(s) and/or metabolite(s) may indicate diluted or adulterated urine, limitations of testing or timing of collection. Imaging/Diagnostics:  CT HEAD WO CONTRAST    Result Date: 2/16/2023  No acute intracranial findings. XR CHEST PORTABLE    Result Date: 2/16/2023  No radiographic evidence of an acute cardiopulmonary process.        Assessment :      Hospital Problems             Last Modified POA    * (Principal) Seizure-like activity ([facility] [address] 2/16/2023 Yes    Acute cystitis without hematuria 2/16/2023 Yes    Tobacco use (Chronic) 2/16/2023 Yes       Plan:     Patient status observation in the  Med/Surge    Seizure-like activity  Neuro consulted  Keppra 500 mg p.o. twice daily starting tomorrow  Monitor for seizure activity  Neurochecks every 4 hours  Seizure precautions  Continuous pulse ox    Acute cystitis  IV hydration  Continue Rocephin  Urine culture pending    Encouraged tobacco cessation    Consultations:   IP CONSULT TO INTERNAL MEDICINE  IP CONSULT TO NEUROLOGY        VIJAYA Pelayo - CNP  2/16/2023  8:11 PM    Copy sent to Dr. Clarissa Farrar MD

## 2023-02-17 NOTE — PROGRESS NOTES
Pt called out and stated she was going to have a seizure. Staff arrived to the room and pt was seizing and lasted 50 seconds. Pt came too, very nauseous but VSS. Neurology notified - Vimpat loading dose was completing when staff entered the room. independent

## 2023-02-17 NOTE — CARE COORDINATION
Discharge planning     Patient had seizure this am. Resting quietly when writer attempted discharge assessment. Will try again later.

## 2023-02-18 ENCOUNTER — APPOINTMENT (OUTPATIENT)
Dept: MRI IMAGING | Age: 23
DRG: 053 | End: 2023-02-18
Payer: MEDICAID

## 2023-02-18 PROBLEM — G43.719 INTRACTABLE CHRONIC MIGRAINE WITHOUT AURA AND WITHOUT STATUS MIGRAINOSUS: Status: ACTIVE | Noted: 2023-02-18

## 2023-02-18 LAB
GLUCOSE BLD-MCNC: 155 MG/DL (ref 65–105)
HCG(URINE) PREGNANCY TEST: NEGATIVE
MAGNESIUM SERPL-MCNC: 1.8 MG/DL (ref 1.6–2.6)
PROLACTIN: 12.62 NG/ML (ref 4.79–23.3)

## 2023-02-18 PROCEDURE — C9254 INJECTION, LACOSAMIDE: HCPCS | Performed by: STUDENT IN AN ORGANIZED HEALTH CARE EDUCATION/TRAINING PROGRAM

## 2023-02-18 PROCEDURE — 82947 ASSAY GLUCOSE BLOOD QUANT: CPT

## 2023-02-18 PROCEDURE — 6360000002 HC RX W HCPCS: Performed by: STUDENT IN AN ORGANIZED HEALTH CARE EDUCATION/TRAINING PROGRAM

## 2023-02-18 PROCEDURE — 2580000003 HC RX 258: Performed by: NURSE PRACTITIONER

## 2023-02-18 PROCEDURE — 6370000000 HC RX 637 (ALT 250 FOR IP): Performed by: STUDENT IN AN ORGANIZED HEALTH CARE EDUCATION/TRAINING PROGRAM

## 2023-02-18 PROCEDURE — 95816 EEG AWAKE AND DROWSY: CPT

## 2023-02-18 PROCEDURE — 84146 ASSAY OF PROLACTIN: CPT

## 2023-02-18 PROCEDURE — 2060000000 HC ICU INTERMEDIATE R&B

## 2023-02-18 PROCEDURE — 2580000003 HC RX 258: Performed by: STUDENT IN AN ORGANIZED HEALTH CARE EDUCATION/TRAINING PROGRAM

## 2023-02-18 PROCEDURE — 81025 URINE PREGNANCY TEST: CPT

## 2023-02-18 PROCEDURE — 99232 SBSQ HOSP IP/OBS MODERATE 35: CPT | Performed by: PSYCHIATRY & NEUROLOGY

## 2023-02-18 PROCEDURE — 70553 MRI BRAIN STEM W/O & W/DYE: CPT

## 2023-02-18 PROCEDURE — 6360000002 HC RX W HCPCS: Performed by: PSYCHIATRY & NEUROLOGY

## 2023-02-18 PROCEDURE — 83735 ASSAY OF MAGNESIUM: CPT

## 2023-02-18 PROCEDURE — 6360000002 HC RX W HCPCS: Performed by: NURSE PRACTITIONER

## 2023-02-18 PROCEDURE — 2580000003 HC RX 258: Performed by: PSYCHIATRY & NEUROLOGY

## 2023-02-18 PROCEDURE — 95822 EEG COMA OR SLEEP ONLY: CPT | Performed by: PSYCHIATRY & NEUROLOGY

## 2023-02-18 PROCEDURE — 6370000000 HC RX 637 (ALT 250 FOR IP): Performed by: NURSE PRACTITIONER

## 2023-02-18 PROCEDURE — 99239 HOSP IP/OBS DSCHRG MGMT >30: CPT | Performed by: STUDENT IN AN ORGANIZED HEALTH CARE EDUCATION/TRAINING PROGRAM

## 2023-02-18 PROCEDURE — 87086 URINE CULTURE/COLONY COUNT: CPT

## 2023-02-18 PROCEDURE — 6360000004 HC RX CONTRAST MEDICATION: Performed by: PSYCHIATRY & NEUROLOGY

## 2023-02-18 PROCEDURE — 36415 COLL VENOUS BLD VENIPUNCTURE: CPT

## 2023-02-18 PROCEDURE — 6370000000 HC RX 637 (ALT 250 FOR IP): Performed by: PSYCHIATRY & NEUROLOGY

## 2023-02-18 PROCEDURE — A9579 GAD-BASE MR CONTRAST NOS,1ML: HCPCS | Performed by: PSYCHIATRY & NEUROLOGY

## 2023-02-18 RX ORDER — BUTALBITAL, ACETAMINOPHEN AND CAFFEINE 50; 325; 40 MG/1; MG/1; MG/1
1 TABLET ORAL EVERY 4 HOURS PRN
Status: DISCONTINUED | OUTPATIENT
Start: 2023-02-18 | End: 2023-02-19 | Stop reason: HOSPADM

## 2023-02-18 RX ORDER — LORAZEPAM 2 MG/ML
1 INJECTION INTRAMUSCULAR EVERY 30 MIN PRN
Status: DISCONTINUED | OUTPATIENT
Start: 2023-02-18 | End: 2023-02-19 | Stop reason: HOSPADM

## 2023-02-18 RX ORDER — TOPIRAMATE 25 MG/1
25 TABLET ORAL EVERY EVENING
Status: DISCONTINUED | OUTPATIENT
Start: 2023-02-18 | End: 2023-02-19 | Stop reason: HOSPADM

## 2023-02-18 RX ORDER — SODIUM CHLORIDE 0.9 % (FLUSH) 0.9 %
10 SYRINGE (ML) INJECTION ONCE
Status: COMPLETED | OUTPATIENT
Start: 2023-02-18 | End: 2023-02-18

## 2023-02-18 RX ORDER — MAGNESIUM SULFATE 1 G/100ML
1000 INJECTION INTRAVENOUS ONCE
Status: COMPLETED | OUTPATIENT
Start: 2023-02-18 | End: 2023-02-18

## 2023-02-18 RX ORDER — CEPHALEXIN 500 MG/1
500 CAPSULE ORAL EVERY 12 HOURS SCHEDULED
Status: DISCONTINUED | OUTPATIENT
Start: 2023-02-18 | End: 2023-02-19 | Stop reason: HOSPADM

## 2023-02-18 RX ADMIN — CEPHALEXIN 500 MG: 500 CAPSULE ORAL at 16:39

## 2023-02-18 RX ADMIN — ACETAMINOPHEN 650 MG: 325 TABLET ORAL at 12:03

## 2023-02-18 RX ADMIN — LACOSAMIDE 150 MG: 10 INJECTION, SOLUTION INTRAVENOUS at 08:35

## 2023-02-18 RX ADMIN — TOPIRAMATE 25 MG: 25 TABLET, FILM COATED ORAL at 16:39

## 2023-02-18 RX ADMIN — ONDANSETRON 4 MG: 2 INJECTION INTRAMUSCULAR; INTRAVENOUS at 08:06

## 2023-02-18 RX ADMIN — LACOSAMIDE 150 MG: 10 INJECTION, SOLUTION INTRAVENOUS at 20:51

## 2023-02-18 RX ADMIN — LEVETIRACETAM 500 MG: 500 TABLET, FILM COATED ORAL at 08:11

## 2023-02-18 RX ADMIN — LORAZEPAM 1 MG: 2 INJECTION INTRAMUSCULAR at 16:08

## 2023-02-18 RX ADMIN — SODIUM CHLORIDE, PRESERVATIVE FREE 10 ML: 5 INJECTION INTRAVENOUS at 16:26

## 2023-02-18 RX ADMIN — GADOTERIDOL 19 ML: 279.3 INJECTION, SOLUTION INTRAVENOUS at 16:25

## 2023-02-18 RX ADMIN — MAGNESIUM SULFATE HEPTAHYDRATE 1000 MG: 1 INJECTION, SOLUTION INTRAVENOUS at 10:17

## 2023-02-18 RX ADMIN — LEVETIRACETAM 500 MG: 500 TABLET, FILM COATED ORAL at 19:49

## 2023-02-18 RX ADMIN — ACETAMINOPHEN 650 MG: 325 TABLET ORAL at 19:48

## 2023-02-18 RX ADMIN — SODIUM CHLORIDE: 9 INJECTION, SOLUTION INTRAVENOUS at 05:21

## 2023-02-18 ASSESSMENT — PAIN DESCRIPTION - DESCRIPTORS: DESCRIPTORS: ACHING

## 2023-02-18 ASSESSMENT — PAIN SCALES - GENERAL: PAINLEVEL_OUTOF10: 3

## 2023-02-18 ASSESSMENT — PAIN DESCRIPTION - LOCATION: LOCATION: HEAD

## 2023-02-18 NOTE — PROGRESS NOTES
MRI, Zulynadine Sultana called unit and requesting nurse and doctor to MRI due to pt having seizure like episode. Writer and RN Tia to MRI, pt acitvity subsided by this time. Pt was alert and oriented. Dr Bee Boning down and orders to given PRN Ativan. Dose given. Pt back from MRI.

## 2023-02-18 NOTE — PROGRESS NOTES
Writer was walking past pts room when her call light went off. Writer entered the room and pts mother said that the pt was complaining of SOB and started having seizure like activity. PT was sitting in the bed with her eyes closed, head down while shaking. After about a minute pts shaking stopped, her body relaxed, she then placed her hand up to her head and began complaining of a headache. Tylenol was given by primary nurse.

## 2023-02-18 NOTE — PROCEDURES
[id number] [facility] [address]                [address] [place] [address], [address] [place] [address]                          ELECTROENCEPHALOGRAM REPORT    PATIENT NAME: Gus Torres                  :        2000  MED REC NO:   [id number]                             ROOM:       5225  ACCOUNT NO:   [de-identified]                           ADMIT DATE: 2023  PROVIDER:     Can Willson    DATE OF EE2023    HISTORY:  This is a 21-year-old female with new-onset seizure-like  activity and headaches and history of asthma and Mary-Parkinson-White  syndrome. She is presently on Keppra and Vimpat. She also has comorbid  affective disorder with depression. MEDICATIONS:  Include Keppra and Vimpat. DESCRIPTION OF PROCEDURE:  Electrodes were applied using paste in  positions dictated by the International 10-20 system of placement. Reviewing montages included both referential and bipolar derivations. In addition to EEG data, EKG and eye movements were recorded. This is a  routine recording. This test was performed on 2023. DESCRIPTION OF THE ACTIVITIES:  At the onset of the study, the patient  is drowsy and asleep with the background demonstrating diffusely slow  background in polymorphic delta activity with superimposed low amplitude  high frequency beta activity. Hyperventilation was performed for 3  minutes with fair effort induces significant buildup of bilateral  slow-wave activity. No seizure activity noted. Photic stimulation did  not induce posterior driving responses. Clear waking background is not  noted. No electrographic seizures noted during the study. EKG montage  revealed artifacts. ELECTRODIAGNOSTIC INTERPRETATION:  This EEG performed predominantly  during sleep demonstrated findings suggestive of sedated sleep. No  epileptiform discharges and no electrographic seizures noted. Clinical  correlation is recommended.         Osmin De La Torre Jayla Webb    D: 02/18/2023 14:10:59       T: 02/18/2023 14:13:16     [email or web address]  Job#: [id number]     Doc#: [id number]    CC:

## 2023-02-18 NOTE — PLAN OF CARE
Pt had a good night overall. Pt safety maintained tele sitter in place. Pt free of seizure activity through out the night wcm. Pt had complaints of pain overnight, given prn tylenol. Bed in lowest position, call light within reach. All questions asked and answered at this time.     Problem: Discharge Planning  Goal: Discharge to home or other facility with appropriate resources  Outcome: Progressing     Problem: ABCDS Injury Assessment  Goal: Absence of physical injury  Outcome: Progressing     Problem: Neurosensory - Adult  Goal: Achieves stable or improved neurological status  Outcome: Progressing     Problem: Neurosensory - Adult  Goal: Absence of seizures  Outcome: Progressing     Problem: Neurosensory - Adult  Goal: Remains free of injury related to seizures activity  Outcome: Progressing     Problem: Neurosensory - Adult  Goal: Achieves maximal functionality and self care  Outcome: Progressing

## 2023-02-18 NOTE — PROGRESS NOTES
Writer and floor nurse called into room by patient who called out complaining of double vision and nausea. At the same time, patient's heart rate was reading tachy on monitor. VS taken in room, PRN zofran given as well. After zofran administration, patient began to seize, lasing approx. 1 minute 30 seconds. Oxygen sats remained stable. Patient recovered shortly after, awake and alert. Primary RN Pema Zavala, notified.

## 2023-02-18 NOTE — DISCHARGE INSTR - COC
Continuity of Care Form    Patient Name: Elias Walker   :  2000  MRN:  [id number]    Admit date:  2023  Discharge date:  ***    Code Status Order: Full Code   Advance Directives:     Admitting Physician:  Stephanie Nunes MD  PCP: Daryle Cogan, MD    Discharging Nurse: [facility] Unit/Room#: 1012/1012-02  Discharging Unit Phone Number: ***    Emergency Contact:   Extended Emergency Contact Information  Primary Emergency Contact: Patrick Vinson  Mobile Phone: [phone]  Relation: Parent    Past Surgical History:  History reviewed. No pertinent surgical history. Immunization History: There is no immunization history on file for this patient.     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    Mild intermittent asthma without complication V05.06    Intractable chronic migraine without aura and without status migrainosus G43.719       Isolation/Infection:   Isolation            No Isolation          Patient Infection Status       None to display            Nurse Assessment:  Last Vital Signs: /66   Pulse 71   Temp 98.4 °F (36.9 °C) (Oral)   Resp 16   Ht 5' (1.524 m)   Wt 215 lb (97.5 kg)   LMP 2023   SpO2 100%   BMI 41.99 kg/m²     Last documented pain score (0-10 scale): Pain Level: 0  Last Weight:   Wt Readings from Last 1 Encounters:   23 215 lb (97.5 kg)     Mental Status:  {IP PT MENTAL STATUS:}    IV Access:  { ARIK IV ACCESS:568054697}    Nursing Mobility/ADLs:  Walking   {CHP DME HSRJ:344373343}  Transfer  {CHP DME OWHB:031628199}  Bathing  {CHP DME RLLM:560012498}  Dressing  {CHP DME FFUZ:197862756}  Toileting  {CHP DME GMYF:761749442}  Feeding  {CHP DME XSKM:455339742}  Med Admin  {CHP DME AFXF:542283209}  Med Delivery   { ARIK MED Delivery:096667706}    Wound Care Documentation and Therapy:        Elimination:  Continence: Bowel: {YES / NM:02019}  Bladder: {YES / YM:87122}  Urinary Catheter: {Urinary Catheter:273077010}   Colostomy/Ileostomy/Ileal Conduit: {YES / OE:01031}       Date of Last BM: ***    Intake/Output Summary (Last 24 hours) at 2023 1804  Last data filed at 2023 1642  Gross per 24 hour   Intake 3214.08 ml   Output 600 ml   Net 2614.08 ml     I/O last 3 completed shifts:   In: 3297.5 [I.V.:2926.4; IV Piggyback:371.1]  Out: -     Safety Concerns:     { ARIK Safety Concerns:912138594}    Impairments/Disabilities:      [facility] Impairments/Disabilities:177721692}    Nutrition Therapy:  Current Nutrition Therapy:   [facility] Diet List:875922297}    Routes of Feeding: {[facility] DME Other Feedings:243003591}  Liquids: {Slp liquid thickness:96012}  Daily Fluid Restriction: {[facility] DME Yes amt example:509935304}  Last Modified Barium Swallow with Video (Video Swallowing Test): {Done Not Done KYFU:345290631}    Treatments at the Time of Hospital Discharge:   Respiratory Treatments: ***  Oxygen Therapy:  {Therapy; copd oxygen:03375}  Ventilator:    { CC Vent EKNT:645368236}    Rehab Therapies: {THERAPEUTIC INTERVENTION:7243820140}  Weight Bearing Status/Restrictions: [facility] Weight Bearin}  Other Medical Equipment (for information only, NOT a DME order):  {EQUIPMENT:399842884}  Other Treatments: ***    Patient's personal belongings (please select all that are sent with patient):  {[facility] DME Belongings:608905310}    RN SIGNATURE:  {Esignature:948837010}    CASE MANAGEMENT/SOCIAL WORK SECTION    Inpatient Status Date: ***    Readmission Risk Assessment Score:  Readmission Risk              Risk of Unplanned Readmission:  8           Discharging to Facility/ Agency   Name:   Address:  Phone:  Fax:    Dialysis Facility (if applicable)   Name:  Address:  Dialysis Schedule:  Phone:  Fax:    / signature: {Esignature:480385319}    PHYSICIAN SECTION    Prognosis: {Prognosis:9841580113}    Condition at Discharge: Renato Tamez Patient Condition:301783236}    Rehab Potential (if transferring to Rehab): {Prognosis:5394134591}    Recommended Labs or Other Treatments After Discharge: ***    Physician Certification: I certify the above information and transfer of Davis Sr  is necessary for the continuing treatment of the diagnosis listed and that she requires {Admit to Appropriate Level of Care:57153} for {GREATER/LESS:233758938} 30 days.      Update Admission H&P: {CHP DME Changes in WSAMU:510030193}    PHYSICIAN SIGNATURE:  {Esignature:404232423}

## 2023-02-18 NOTE — PROGRESS NOTES
Writer spoke with [facility], patient accepted to [facility], room 143. Report number: [phone]. [address] [facility] access will set up transport, awaiting callback for ETA. [facility].

## 2023-02-18 NOTE — PROGRESS NOTES
Comprehensive Nutrition Assessment    Type and Reason for Visit:  Positive Nutrition Screen (Unsure or amount of weight loss, decreased appetite)    Nutrition Recommendations/Plan:   Continue Regular diet  Start Ensure Plus High Protein 1x/day  Monitor p. Intakes, constipation status and labs     Malnutrition Assessment:  Malnutrition Status:  Mild malnutrition (02/18/23 1621)    Context:  Chronic Illness     Findings of the 6 clinical characteristics of malnutrition:  Energy Intake:  75% or less estimated energy requirements for 1 month or longer  Weight Loss:  Mild weight loss (specify amount and time period) (6.5% loss in 1 year)     Body Fat Loss:  No significant body fat loss     Muscle Mass Loss:  No significant muscle mass loss    Fluid Accumulation:  No significant fluid accumulation Extremities   Strength:  Not Performed    Nutrition Assessment:    Patient admission is related to seizure-like activity, UTI and abnormal EKG. Patient reports a decreased appetite and weight loss over the past 1.5 months. Unsure if patient's reports of 50 lbs weight loss in 1.5 months is accurate. Patient was able to eat 100% of breakfast this morning. Patient ate 26-50% of [facility] chicken bowel (mashed potatoes, gravy, corn and chicken). Patient reports she  also has been constipated for the past 1.5 months and has bleeding from rectum after passing hard stools. Continue Regular diet and start Ensure Plus High Protein 1x/day. Monitor p.o intakes, constipation status and labs. Nutrition Related Findings:    No edema. Active bowel sounds. Constipation with bleeding rectum after bowel movement per patient. Poor dentition Wound Type: None       Current Nutrition Intake & Therapies:    Average Meal Intake: %, 51-75%, 26-50%     ADULT DIET;  Regular  ADULT ORAL NUTRITION SUPPLEMENT; Breakfast; Standard High Calorie/High Protein Oral Supplement    Anthropometric Measures:  Height: 5' (152.4 cm)  Ideal Body Weight (IBW): 100 lbs (45 kg)       Current Body Weight: 215 lb (97.5 kg), 215 % IBW.     Current BMI (kg/m2): 42  Usual Body Weight: 230 lb (104.3 kg) (2/14/2022)  % Weight Change (Calculated): -6.5                    BMI Categories: Obese Class 3 (BMI 40.0 or greater)    Estimated Daily Nutrient Needs:  Energy Requirements Based On: Kcal/kg  Weight Used for Energy Requirements: Current  Energy (kcal/day): 4070-6590 kcal (13-15 kcal/kg)  Weight Used for Protein Requirements: Ideal  Protein (g/day): 54-63 gm of protein (1.2-1.4 gm/kg)       Nutrition Diagnosis:   Predicted inadequate energy intake related to inadequate protein-energy intake as evidenced by weight loss, intake 51-75%    Nutrition Interventions:   Food and/or Nutrient Delivery: Continue Current Diet, Start Oral Nutrition Supplement  Nutrition Education/Counseling: Education not indicated  Coordination of Nutrition Care: Continue to monitor while inpatient       Goals:     Goals: PO intake 75% or greater       Nutrition Monitoring and Evaluation:      Food/Nutrient Intake Outcomes: Food and Nutrient Intake, Supplement Intake  Physical Signs/Symptoms Outcomes: Biochemical Data, Weight, Constipation    Discharge Planning:    Continue current diet         Ericka OZUNAN, RDN, LDN  Lead Clinical Dietitian  RD Office Phone [phone]

## 2023-02-18 NOTE — FLOWSHEET NOTE
02/18/23 1601   Treatment Team Notification   Reason for Communication Change in status  (pt having seizures in mri, they are requesting rn and md presence)   Team Member Name [facility]   Treatment Team Role Attending Provider   Method of Communication Secure Message   Response En route   Notification Time [phone]

## 2023-02-18 NOTE — PROGRESS NOTES
Writer informed by patient's nurse, Brennan Brooks that neurology would like patient transferred to ICU and eventually have patient sent to [facility]'s neuro ICU. Attending notified who will initiate transfer via [facility] access. Lj David, house supervisor notified. ICU beds full at the time being, patient stable currently. Will await call from ???? access.

## 2023-02-18 NOTE — PROGRESS NOTES
NEUROLOGY INPATIENT PROGRESS NOTE    2/18/2023         Madison Kumar is a  20 y.o. female admitted on 2/16/2023 with  Abnormal EKG [R94.31]  Seizure-like activity ([address] [R56.9]  Urinary tract infection without hematuria, site unspecified [N39.0]  Seizure ([address] [R56.9]    Reason for consult: Seizure activity  History is obtained mostly from the patient and the medical record and from the caregivers. Chart is reviewed and patient is examined. Briefly, this is a  20 y.o. female with migraine headaches was admitted on 2/16/2023 with headaches and seizures. Patient does not have any history of seizures and she had 3 seizures on the night prior to admit and 3 seizures on the day of admit. Seizures are described as shaking of extremities with impairment of consciousness. No report of tongue bite or bladder incontinence. Episodes were lasting 1-2 minutes to couple of minutes followed by postictal lethargy. Patient was loaded with IV Keppra and was continued on 500 mg twice daily. Patient also complained of \"migraines\" and were described as throbbing/pounding headaches associated with photophobia and phonophobia of mild to moderate severity located in right forehead becoming generalized at times. She also had nausea associated with these headaches. Patient also had comorbid anxiety/depression and she was supposedly on antidepressants but not taking any meds. She stated that she was using ibuprofen and not on any daily medications because of lack of insurance coverage. Since admitted; she has had 2 episodes and most recent episode occurred this morning and it was of generalized shaking lasting for few seconds without any postictal state. No evidence of tongue bite or bladder incontinence. 2/17/23:  patient's nurse called stating that patient had a seizure activity this morning 8:14 AM described as \"body shaking, eyes closed\" lasting for 1.5 min with no postictal state.   No tongue bite and no bladder incontinence. Her last seizure activity was yesterday morning at 9:30 AM and patient has not had any seizure activity yesterday after initiating her on loading dose of IV Vimpat followed by 100 bid. 2/18/23: Patient had 1 episode this morning and it was a witnessed episode by nursing staff. It occurred when patient's mom is visiting her. Patient was having \"shaking of upper body with eyes shut\". No tongue bite. No foam at the mouth. No bladder incontinence. No shaking of lower body. Episode lasted for 1.5 min and patient was back to \"normal self right away\" afterwards. But patient was noted to be short of breath. Patient was started on 2 L nasal cannula. Vitals at the time of episode 142/75, HR 82/min, glu 155, pulse ox 100%. No current facility-administered medications on file prior to encounter. No current outpatient medications on file prior to encounter. Allergies: Tee Hogue is allergic to aspirin. Past Medical History:   Diagnosis Date    Asthma        History reviewed. No pertinent surgical history. Social History: Tee Hogue  reports that she has been smoking. She has never used smokeless tobacco. She reports current alcohol use. She reports that she does not use drugs.     Family History   Problem Relation Age of Onset    Diabetes Mother     No Known Problems Father     Seizures Brother         since birth       Current Medications:     cephALEXin  500 mg Oral 2 times per day    lacosamide (VIMPAT) IVPB  150 mg IntraVENous BID    magnesium sulfate  1,000 mg IntraVENous Once    budesonide-formoterol  1 puff Inhalation BID    levETIRAcetam  500 mg Oral BID    sodium chloride flush  5-40 mL IntraVENous 2 times per day    enoxaparin  40 mg SubCUTAneous Daily     PRN Meds include: LORazepam, sodium chloride flush, sodium chloride, ondansetron **OR** ondansetron, acetaminophen **OR** acetaminophen, polyethylene glycol    ROS:   Constitutional Negative for fever and chills   HEENT Negative for ear discharge, ear pain, nosebleed   Eyes Negative for photophobia, pain and discharge   Respiratory Negative for hemoptysis and sputum   Cardiovascular Negative for orthopnea, claudication and PND   Gastrointestinal Negative for abdominal pain, diarrhea, blood in stool   Musculoskeletal Negative for joint pain, negative for myalgia   Skin Negative for rash or itching   Endo/heme/allergies Negative for polydipsia, environmental allergy   Psychiatric Negative for suicidal ideation. Patient is not anxious           Objective:   /80   Pulse 63   Temp 97.5 °F (36.4 °C) (Axillary)   Resp 17   Ht 5' (1.524 m)   Wt 215 lb (97.5 kg)   LMP 02/01/2023   SpO2 100%   BMI 41.99 kg/m²     Blood pressure range: Systolic (76NFU), VXR:932 , Min:96 , QBB:823   ; Diastolic (66SFP), NNT:46, Min:55, Max:92      Continuous infusions:    sodium chloride 100 mL/hr at 02/18/23 0521    sodium chloride         ON EXAMINATION:  GENERAL  Appears comfortable and in no distress   HEENT  NC/ AT   NECK  Supple and no bruits heard   Cardiovascular  S1, S2 heard; radial pulse intact   MENTAL STATUS:  Alert, oriented, intact memory, no confusion, normal speech, normal language, no hallucination or delusion   CRANIAL NERVES: II     -       Pupils reactive b/l visual acuity: 20/30 OU; Fundus exam: intact venous pulsations;  Visual fields intact to confrontation  III,IV,VI -  EOMs full, no afferent defect, no                      STAR, no ptosis  V     -     Normal facial sensation  VII    -     Normal facial symmetry  VIII   -     Intact hearing  IX,X -     Symmetrical palate  XI    -     Symmetrical shoulder shrug  XII   -     Midline tongue, no atrophy    MOTOR FUNCTION:  Normal bulk, normal tone, normal power;  no involuntary movements, no tremor   SENSORY FUNCTION:  Normal touch, normal pin, normal vibration, normal proprioception   CEREBELLAR FUNCTION:  Intact fine motor control over upper limbs   REFLEX FUNCTION:  Symmetric, no perverted reflex, no Babinski sign   STATION and GAIT  Not tested         Data:    Lab Results:   No results found for: CHOL, LDLCHOLESTEROL, HDL, TRIG, ALT, AST, TSH, INR, GLUF, LABA1C, LABMICR, [id number]  Hematology:  Recent Labs     02/16/23  1520   WBC 6.2   HGB 14.4   HCT 44.6        Chemistry:  Recent Labs     02/16/23  1520 02/17/23  0547    141   K 3.6* 4.4    110*   CO2 25 23   GLUCOSE 86 100*   BUN 14 18   CREATININE 0.55 0.63   MG  --  1.8   CALCIUM 9.2 8.6     No results for input(s): PROT, LABALBU, LABA1C, Y3RDRDN, S4KYXCO, FT4, TSH, AST, ALT, LDH, AMMONIA, CHOL, HDL, LDLCHOLESTEROL, CHOLHDLRATIO, TRIG, VLDL, CKTOTAL, CKMB, CKMBINDEX, RF, KEYLA in the last 72 hours. No results found for: PHENYTOIN, PHENYTOIN, VALPROATE, CBMZ        Diagnostic data reviewed:  CT head 2/16/2023: No acute intracranial abnormalities. Impression and Plan: Ms. Nish Pulliam is a 20 y.o. female with   New onset spells; epileptic versus nonepileptic spells: eeg requested yesterday; couldn't be done earlier; cont keppra 500 bid and vimpat at 150 bid. Due to progressive depression symptomatology; Keppra dose was not increased any further; was started on Vimpat; dose was increased to 200 bid. MRI brain with contrast pending; patient is being transferred to [facility] for LTME as per primary team.  We will follow with you while she is here. Intractable migraine headaches with improving headache severity; on iv mag and topamax 25 mg qpm; patient feels headaches are stable. Comorbid affective disorder with anxiety/depression: Psych consult pending. This note was partially created using voice recognition software and is inherently subject to errors including those of syntax and \"sound alike\" substitutions which may escape proofreading. In such instances, original meaning may be extrapolated by contextual derivation.   Shabbir Nye MD 2/18/2023     Addendum: as this morning episode is not suggestive of sz episode as per description; Vimpat dose not increased any further. Continued at 150 bid.    Arelis Hernandez MD 2/18/2023 1:17 PM

## 2023-02-18 NOTE — PROGRESS NOTES
Pt mother at bedside and called out to state pt was having a seizure. RN to bedside and states pt was shaking with eyes closed for about a minute. Pt them lifted head and answered all questions appropriately, stated to have a headache, PRN Tylenol given. VSS . Dr Melissa Prieto and Dr Sophia Martinez notified.

## 2023-02-18 NOTE — PROGRESS NOTES
[facility]  Office: [address], DO, Kathrin Oconnell, DO, Casper Chad, DO, Jairo Duron Blood, DO, Eyad Ren MD, eParl Aldana MD, Hema Wesley MD, Susan Nicholson MD,  Prema Pugh MD, Serafin Calderon MD, Savannah Richmond, DO, Anjali Arango MD,  Stephan Crawford MD, Trey Albarran MD, Cuauhtemoc Hurtado, DO, Shreyas Franklin MD, Tita Duron MD, Berenice Hdz DO, Qamar Blank MD, Lexus Zhu MD, Phil Kat MD, Dakota Sheppard MD, Allie Vance, DO, Coretta Weems MD, Gini Wells MD, Rodrigo Madrid, Ana Taylor, CNP, Ashley Patel, CNP, Rita Rucker, CNP,  Franki Lundborg, [facility], Alf Pineda, CNP, Edith Booker, CNP, Orin Kong, CNP, Rosana Irby, CNP, Iris Melendez, CNP, Trang Green PA-C, Lg Durham, CNS, Lilian Garcia, CNP, Daniel Gregorio, [facility]    Progress Note    2/18/2023    12:51 PM    Name:   Adelso Marks  MRN:     [id number]     [place]:      [de-identified]   Room:   [facility]   Day:  1  Admit Date:  2/16/2023  3:00 PM    PCP:   Kashif Garcia MD  Code Status:  Full Code    Subjective:     C/C:   Chief Complaint   Patient presents with    Seizures     Pt states she had 3 seizures today and 3 last night. Headache     Interval History Status: not changed. Patient Had 2 episodes of seizure today. Patient states that it starts with some nausea and headache and then patient has seizure-like activity for around 50 seconds to 1 minute. Patient is not confused post seizure. She had some frontal headache and dizziness on my exam. Complains of blurry vision. Brief History:        Patient with past medical history of asthma, family history of seizures presented to the ER with complaints of headache and seizures.   Patient states she had 3 seizures overnight and 3 seizures today and that her boyfriend witnessed one of the seizures and he claimed that lasted about 5 minutes. Her boyfriend states that she would become very stiff and would not respond to him and remained ' out of it\" for a few minutes after each seizure. On arrival to the ER the patient is not postictal and she denies a history of seizure. CT head was negative. UA is positive for urinary tract infection. Neuro was consulted and they recommended loading Keppra. We will continue fluids and Rocephin. Urine cultures pending. Initial EKG showed finding of WPW syndrome. EKG was repeated and showed repolarization changes    Review of Systems:     Constitutional:  negative for chills, fevers, sweats  Respiratory:  negative for cough, dyspnea on exertion, shortness of breath, wheezing  Cardiovascular:  negative for chest pain, chest pressure/discomfort, lower extremity edema, palpitations  Gastrointestinal:  negative for abdominal pain, constipation, diarrhea, nausea, vomiting  Neurological: Positive for headache    Medications: Allergies: Allergies   Allergen Reactions    Aspirin Other (See Comments)     ulcers       Current Meds:   Scheduled Meds:    cephALEXin  500 mg Oral 2 times per day    lacosamide (VIMPAT) IVPB  150 mg IntraVENous BID    topiramate  25 mg Oral QPM    budesonide-formoterol  1 puff Inhalation BID    levETIRAcetam  500 mg Oral BID    sodium chloride flush  5-40 mL IntraVENous 2 times per day    enoxaparin  40 mg SubCUTAneous Daily     Continuous Infusions:    sodium chloride 100 mL/hr at 02/18/23 0521    sodium chloride       PRN Meds: LORazepam, sodium chloride flush, sodium chloride, ondansetron **OR** ondansetron, acetaminophen **OR** acetaminophen, polyethylene glycol    Data:     Past Medical History:   has a past medical history of Asthma. Social History:   reports that she has been smoking. She has never used smokeless tobacco. She reports current alcohol use. She reports that she does not use drugs.      Family History:   Family History   Problem Relation Age of Onset    Diabetes Mother     No Known Problems Father     Seizures Brother         since birth       Vitals:  BP (!) 142/75   Pulse 82   Temp 99.1 °F (37.3 °C) (Oral)   Resp 16   Ht 5' (1.524 m)   Wt 215 lb (97.5 kg)   LMP 2023   SpO2 100%   BMI 41.99 kg/m²   Temp (24hrs), Av.1 °F (36.7 °C), Min:97.5 °F (36.4 °C), Max:99.1 °F (37.3 °C)    Recent Labs     23  1207   POCGLU 155*       I/O (24Hr): Intake/Output Summary (Last 24 hours) at 2023 1251  Last data filed at 2023 1109  Gross per 24 hour   Intake 2334.11 ml   Output 600 ml   Net 1734.11 ml         Labs:  Hematology:  Recent Labs     23  1520   WBC 6.2   RBC 5.34*   HGB 14.4   HCT 44.6   MCV 83.5   MCH 27.0   MCHC 32.3   RDW 13.0      MPV 9.8       Chemistry:  Recent Labs     23  1520 23  0547 23  0936    141  --    K 3.6* 4.4  --     110*  --    CO2 25 23  --    GLUCOSE 86 100*  --    BUN 14 18  --    CREATININE 0.55 0.63  --    MG  --  1.8 1.8   ANIONGAP 9 8*  --    LABGLOM >60 >60  --    CALCIUM 9.2 8.6  --      Recent Labs     23  1207   POCGLU 155*     ABG:No results found for: POCPH, PHART, PH, POCPCO2, NDZ6EIN, PCO2, POCPO2, PO2ART, PO2, POCHCO3, CFG7UBJ, HCO3, NBEA, PBEA, BEART, BE, THGBART, THB, YHI2FQI, JVPE4JXA, C7HEOJXV, O2SAT, FIO2  Lab Results   Component Value Date/Time    SPECIAL NOT REPORTED 2022 04:07 PM     No results found for: CULTURE    Radiology:  CT HEAD WO CONTRAST    Result Date: 2023  No acute intracranial findings. XR CHEST PORTABLE    Result Date: 2023  No radiographic evidence of an acute cardiopulmonary process.        Physical Examination:        General appearance:  alert, cooperative and mild distress  Mental Status:  oriented to person, place and time and normal affect  Lungs:  clear to auscultation bilaterally, normal effort  Heart:  regular rate and rhythm, no murmur  Abdomen:  soft, nontender, nondistended, normal bowel sounds, no masses, hepatomegaly, splenomegaly  Extremities:  no edema, redness, tenderness in the calves  Skin:  no gross lesions, rashes, induration    Assessment:        Hospital Problems             Last Modified POA    * (Principal) Seizure-like activity ([address] 2/16/2023 Yes    Acute cystitis without hematuria 2/16/2023 Yes    Tobacco use (Chronic) 2/16/2023 Yes    Seizure ([address] 2/17/2023 Yes    WPW syndrome 2/17/2023 Yes    Mild intermittent asthma without complication 6/00/1901 Yes    Intractable chronic migraine without aura and without status migrainosus 2/18/2023 Yes     Plan:        Recurrent seizure episodes  Patient is not postictal, no tongue biting, no incontinence  Maintained on keppra  Lamictal increased  Telesitter in place  Mri pending  Consult psych  Discussed case with neurology, continue antiepileptics  Started on topamax for possible migraine  Rocephin for acute cystitis, follow-up with urine cultures  Cardiology consulted fo findings of WPW on ekg. No intervention, can follow up in clinic per DR Barney. Continue symbicort for asthma    Discussed with Dr. Aron Walter, patient needs long-term EEG monitoring to rule out seizures. We already increased Lamictal this morning. Will initiate transfer to [facility].     Chel Alejandra MD  2/18/2023  12:51 PM

## 2023-02-18 NOTE — PLAN OF CARE
Writer RN called by lead RN and stated pt to be having a seizure. Lead RN to bedside with pt due to heart monitor alarming. Upon walking in, pt stated to feel nauseous and blurred vision. It was said by RN that pt started shaking and closed eyes, did not respond to verbal command for about a minute and a half. Pt not post ictal. Alert and oriented after activity stopped. Dr Jean-Claude Carlisle and Dr Lupe Carter notified. Orders to increase Vimpat placed and Psych consult placed. Writer spoke with [facility] nurse to set up tele-psych. Consent signed and faxed. Waiting call back. Pt is currently resting in bedd, states to have a headache but otherwise feels ok. Cont pulse ox placed. Tele-sitter still on.        Problem: ABCDS Injury Assessment  Goal: Absence of physical injury  2/18/2023 0845 by Shiva Camp RN  Outcome: Progressing     Problem: Neurosensory - Adult  Goal: Absence of seizures  2/18/2023 0845 by Shiva Camp RN  Outcome: Progressing     Problem: Neurosensory - Adult  Goal: Achieves maximal functionality and self care  2/18/2023 0845 by Shiva Camp RN  Outcome: Progressing

## 2023-02-19 ENCOUNTER — HOSPITAL ENCOUNTER (INPATIENT)
Age: 23
LOS: 2 days | Discharge: HOME | DRG: 053 | End: 2023-02-21
Attending: PSYCHIATRY & NEUROLOGY | Admitting: PSYCHIATRY & NEUROLOGY
Payer: MEDICAID

## 2023-02-19 VITALS
BODY MASS INDEX: 42.21 KG/M2 | DIASTOLIC BLOOD PRESSURE: 61 MMHG | TEMPERATURE: 97.9 F | OXYGEN SATURATION: 98 % | WEIGHT: 215 LBS | HEIGHT: 60 IN | HEART RATE: 68 BPM | SYSTOLIC BLOOD PRESSURE: 102 MMHG | RESPIRATION RATE: 16 BRPM

## 2023-02-19 DIAGNOSIS — R56.9 SEIZURE (HCC): Primary | ICD-10-CM

## 2023-02-19 PROBLEM — F41.9 ANXIETY: Status: ACTIVE | Noted: 2023-02-19

## 2023-02-19 PROBLEM — F32.A DEPRESSION: Status: ACTIVE | Noted: 2023-02-19

## 2023-02-19 LAB
MICROORGANISM SPEC CULT: NORMAL
SPECIMEN DESCRIPTION: NORMAL

## 2023-02-19 PROCEDURE — 2060000000 HC ICU INTERMEDIATE R&B

## 2023-02-19 PROCEDURE — 6360000002 HC RX W HCPCS: Performed by: STUDENT IN AN ORGANIZED HEALTH CARE EDUCATION/TRAINING PROGRAM

## 2023-02-19 PROCEDURE — 95711 VEEG 2-12 HR UNMONITORED: CPT

## 2023-02-19 PROCEDURE — 6360000002 HC RX W HCPCS

## 2023-02-19 PROCEDURE — 6370000000 HC RX 637 (ALT 250 FOR IP): Performed by: STUDENT IN AN ORGANIZED HEALTH CARE EDUCATION/TRAINING PROGRAM

## 2023-02-19 PROCEDURE — 99223 1ST HOSP IP/OBS HIGH 75: CPT | Performed by: PSYCHIATRY & NEUROLOGY

## 2023-02-19 PROCEDURE — 2580000003 HC RX 258: Performed by: STUDENT IN AN ORGANIZED HEALTH CARE EDUCATION/TRAINING PROGRAM

## 2023-02-19 PROCEDURE — 6370000000 HC RX 637 (ALT 250 FOR IP)

## 2023-02-19 PROCEDURE — 94761 N-INVAS EAR/PLS OXIMETRY MLT: CPT

## 2023-02-19 PROCEDURE — 6370000000 HC RX 637 (ALT 250 FOR IP): Performed by: PSYCHIATRY & NEUROLOGY

## 2023-02-19 PROCEDURE — 95700 EEG CONT REC W/VID EEG TECH: CPT

## 2023-02-19 RX ORDER — ONDANSETRON 4 MG/1
4 TABLET, ORALLY DISINTEGRATING ORAL EVERY 8 HOURS PRN
Status: DISCONTINUED | OUTPATIENT
Start: 2023-02-19 | End: 2023-02-21 | Stop reason: HOSPADM

## 2023-02-19 RX ORDER — POLYETHYLENE GLYCOL 3350 17 G/17G
17 POWDER, FOR SOLUTION ORAL DAILY PRN
Status: DISCONTINUED | OUTPATIENT
Start: 2023-02-19 | End: 2023-02-21 | Stop reason: HOSPADM

## 2023-02-19 RX ORDER — TOPIRAMATE 25 MG/1
25 TABLET ORAL 2 TIMES DAILY
Status: DISCONTINUED | OUTPATIENT
Start: 2023-02-19 | End: 2023-02-20

## 2023-02-19 RX ORDER — ONDANSETRON 2 MG/ML
4 INJECTION INTRAMUSCULAR; INTRAVENOUS EVERY 6 HOURS PRN
Status: DISCONTINUED | OUTPATIENT
Start: 2023-02-19 | End: 2023-02-21 | Stop reason: HOSPADM

## 2023-02-19 RX ORDER — ACETAMINOPHEN 325 MG/1
650 TABLET ORAL EVERY 6 HOURS PRN
Status: DISCONTINUED | OUTPATIENT
Start: 2023-02-19 | End: 2023-02-21 | Stop reason: HOSPADM

## 2023-02-19 RX ORDER — MAGNESIUM SULFATE 1 G/100ML
1000 INJECTION INTRAVENOUS ONCE
Status: COMPLETED | OUTPATIENT
Start: 2023-02-19 | End: 2023-02-19

## 2023-02-19 RX ORDER — SODIUM CHLORIDE 0.9 % (FLUSH) 0.9 %
5-40 SYRINGE (ML) INJECTION EVERY 12 HOURS SCHEDULED
Status: DISCONTINUED | OUTPATIENT
Start: 2023-02-19 | End: 2023-02-21 | Stop reason: HOSPADM

## 2023-02-19 RX ORDER — SODIUM CHLORIDE 0.9 % (FLUSH) 0.9 %
5-40 SYRINGE (ML) INJECTION PRN
Status: DISCONTINUED | OUTPATIENT
Start: 2023-02-19 | End: 2023-02-21 | Stop reason: HOSPADM

## 2023-02-19 RX ORDER — LACOSAMIDE 100 MG/1
200 TABLET ORAL 2 TIMES DAILY
Status: DISCONTINUED | OUTPATIENT
Start: 2023-02-19 | End: 2023-02-21 | Stop reason: HOSPADM

## 2023-02-19 RX ORDER — LEVETIRACETAM 500 MG/1
500 TABLET ORAL 2 TIMES DAILY
Status: DISCONTINUED | OUTPATIENT
Start: 2023-02-19 | End: 2023-02-21 | Stop reason: HOSPADM

## 2023-02-19 RX ORDER — ENOXAPARIN SODIUM 100 MG/ML
40 INJECTION SUBCUTANEOUS DAILY
Status: DISCONTINUED | OUTPATIENT
Start: 2023-02-19 | End: 2023-02-21 | Stop reason: HOSPADM

## 2023-02-19 RX ORDER — SODIUM CHLORIDE 9 MG/ML
INJECTION, SOLUTION INTRAVENOUS PRN
Status: DISCONTINUED | OUTPATIENT
Start: 2023-02-19 | End: 2023-02-21 | Stop reason: HOSPADM

## 2023-02-19 RX ORDER — MAGNESIUM SULFATE IN WATER 40 MG/ML
2000 INJECTION, SOLUTION INTRAVENOUS PRN
Status: DISCONTINUED | OUTPATIENT
Start: 2023-02-20 | End: 2023-02-21 | Stop reason: HOSPADM

## 2023-02-19 RX ORDER — TOPIRAMATE 25 MG/1
25 TABLET ORAL 2 TIMES DAILY
Status: DISCONTINUED | OUTPATIENT
Start: 2023-02-20 | End: 2023-02-19

## 2023-02-19 RX ORDER — IBUPROFEN 200 MG
200 TABLET ORAL EVERY 6 HOURS PRN
COMMUNITY

## 2023-02-19 RX ORDER — LORAZEPAM 2 MG/ML
1 INJECTION INTRAMUSCULAR EVERY 5 MIN PRN
Status: DISCONTINUED | OUTPATIENT
Start: 2023-02-19 | End: 2023-02-21 | Stop reason: HOSPADM

## 2023-02-19 RX ORDER — ACETAMINOPHEN 650 MG/1
650 SUPPOSITORY RECTAL EVERY 6 HOURS PRN
Status: DISCONTINUED | OUTPATIENT
Start: 2023-02-19 | End: 2023-02-21 | Stop reason: HOSPADM

## 2023-02-19 RX ADMIN — SODIUM CHLORIDE, PRESERVATIVE FREE 10 ML: 5 INJECTION INTRAVENOUS at 08:40

## 2023-02-19 RX ADMIN — LACOSAMIDE 200 MG: 100 TABLET, FILM COATED ORAL at 08:38

## 2023-02-19 RX ADMIN — BUTALBITAL, ACETAMINOPHEN, AND CAFFEINE 1 TABLET: 325; 50; 40 TABLET ORAL at 00:22

## 2023-02-19 RX ADMIN — ACETAMINOPHEN 650 MG: 325 TABLET ORAL at 14:07

## 2023-02-19 RX ADMIN — SODIUM CHLORIDE, PRESERVATIVE FREE 10 ML: 5 INJECTION INTRAVENOUS at 20:29

## 2023-02-19 RX ADMIN — LACOSAMIDE 200 MG: 100 TABLET, FILM COATED ORAL at 20:29

## 2023-02-19 RX ADMIN — TOPIRAMATE 25 MG: 25 TABLET, FILM COATED ORAL at 11:57

## 2023-02-19 RX ADMIN — TOPIRAMATE 25 MG: 25 TABLET, FILM COATED ORAL at 20:29

## 2023-02-19 RX ADMIN — ENOXAPARIN SODIUM 40 MG: 100 INJECTION SUBCUTANEOUS at 08:39

## 2023-02-19 RX ADMIN — ACETAMINOPHEN 650 MG: 325 TABLET ORAL at 20:53

## 2023-02-19 RX ADMIN — LEVETIRACETAM 500 MG: 500 TABLET, FILM COATED ORAL at 20:28

## 2023-02-19 RX ADMIN — LEVETIRACETAM 500 MG: 500 TABLET, FILM COATED ORAL at 08:39

## 2023-02-19 RX ADMIN — ONDANSETRON 4 MG: 4 TABLET, ORALLY DISINTEGRATING ORAL at 14:08

## 2023-02-19 RX ADMIN — MAGNESIUM SULFATE HEPTAHYDRATE 1000 MG: 1 INJECTION, SOLUTION INTRAVENOUS at 11:00

## 2023-02-19 ASSESSMENT — ENCOUNTER SYMPTOMS
PHOTOPHOBIA: 1
NAUSEA: 1
VOMITING: 1
VOICE CHANGE: 0

## 2023-02-19 ASSESSMENT — PAIN SCALES - GENERAL
PAINLEVEL_OUTOF10: 3
PAINLEVEL_OUTOF10: 8
PAINLEVEL_OUTOF10: 9
PAINLEVEL_OUTOF10: 10
PAINLEVEL_OUTOF10: 5
PAINLEVEL_OUTOF10: 0

## 2023-02-19 ASSESSMENT — PAIN DESCRIPTION - LOCATION
LOCATION: HEAD

## 2023-02-19 ASSESSMENT — PAIN DESCRIPTION - DESCRIPTORS
DESCRIPTORS: ACHING

## 2023-02-19 ASSESSMENT — PAIN - FUNCTIONAL ASSESSMENT: PAIN_FUNCTIONAL_ASSESSMENT: ACTIVITIES ARE NOT PREVENTED

## 2023-02-19 ASSESSMENT — PAIN DESCRIPTION - ORIENTATION: ORIENTATION: MID

## 2023-02-19 NOTE — H&P
[facility] Neurology   [address] [place] [address]    HISTORY AND PHYSICAL EXAMINATION            Date:   2/19/2023  Patient name:  Stefanie Michel  Date of admission:  2/19/2023  1:51 AM  MRN:   [id number]  Account:  [de-identified]  YOB: 2000  PCP:    Brandon Bautista MD  Room:   [address] [place] [address]  Code Status:    Full Code    Chief Complaint:     Seizure like acitivity      History Obtained From:     patient    History of Present Illness:     28-year-old female with past medical history of migraine headaches, asthma and depression came as a transfer from [facility] for concern of seizure-like activity. As per initial reports patient does not have any history of seizures but had 3 seizures on the night prior to admission and 3 seizures on the day of admission at [facility].  The episodes were described as shaking of extremities with impairment of consciousness. No reported urinary incontinence or fecal incontinence was noticed. Episodes lasted 1-2 minutes to couple of minutes followed by postictal lethargy. Patient was loaded with IV Keppra and was continued on 500 mg twice daily. She also complained of migraine headaches were described as throbbing/pounding with associated photophobia and phonophobia with mild to moderate severity level. Headaches were also associated with nausea and vomiting. Patient is supposedly on antidepressant but not taking any medications. On 2/17 RN reported seizure-like activity as body shaking, eyes closed lasting for 1.5 minutes. She was treated with IV Vimpat loading dose followed by 100 twice daily. On 2/18 patient had similar episodes. She was transferred to [facility] for long-term EEG monitoring to characterize epileptic versus nonepileptic episodes. Recommended to continue Keppra 500 twice daily and Vimpat 200 twice daily. On my evaluation, patient was awake alert and oriented x3 without any focal deficit.   No seizure activity was noted. Poor oral hygiene. Started on video EEG monitoring. Psych consult. Continue Keppra 500 twice daily and Vimpat 200 twice daily        Past Medical History:     Past Medical History:   Diagnosis Date    Asthma         Past Surgical History:     No past surgical history on file. Medications Prior to Admission:     Prior to Admission medications    Not on File        Allergies:     Aspirin    Social History:     Tobacco:    reports that she has been smoking. She has never used smokeless tobacco.  Alcohol:      reports current alcohol use. Drug Use:  reports no history of drug use. Family History:     Family History   Problem Relation Age of Onset    Diabetes Mother     No Known Problems Father     Seizures Brother         since birth       Review of Systems:     Review of Systems   Constitutional:  Negative for fatigue. HENT:  Negative for drooling and voice change. Eyes:  Positive for photophobia. Negative for visual disturbance. Cardiovascular:  Negative for palpitations. Gastrointestinal:  Positive for nausea and vomiting. Endocrine: Negative for polyuria. Neurological:  Positive for seizures and headaches. Negative for facial asymmetry and weakness.        Physical Exam:   /70   Pulse 63   Temp 97.9 °F (36.6 °C) (Oral)   Resp 19   Ht 5' (1.524 m)   Wt 198 lb 10.2 oz (90.1 kg)   LMP 2023   SpO2 97%   BMI 38.79 kg/m²   Temp (24hrs), Av.2 °F (36.8 °C), Min:97.5 °F (36.4 °C), Max:99.1 °F (37.3 °C)    Recent Labs     23  1207   POCGLU 155*     No intake or output data in the 24 hours ending 23 0241      Neurologic Exam    GENERAL  Appears comfortable and in no distress   HEENT  NC/ AT   HEART  S1 and S2 heard; palpation of pulses: radial pulse    NECK  Supple and no bruits heard   MENTAL STATUS:  Alert, oriented, intact memory, no confusion, normal speech, normal language, no hallucination or delusion   CRANIAL NERVES: II     - Visual fields intact to confrontation  III,IV,VI -  PERR, EOMs full, no ptosis  V     -     Normal facial sensation   VII    -     Normal facial symmetry  VIII   -     Intact hearing   IX,X -     Symmetrical palate  XI    -     Symmetrical shoulder shrug  XII   -     Midline tongue, no atrophy    MOTOR FUNCTION: RUE: Significant for good strength of grade 5/5 in proximal and distal muscle groups   LUE: Significant for good strength of grade 5/5 in proximal and distal muscle groups   RLE: Significant for good strength of grade 5/5 in proximal and distal muscle groups   LLE: Significant for good strength of grade 5/5 in proximal and distal muscle groups      Normal bulk, normal tone and no involuntary movements, no tremor   SENSORY FUNCTION:  Normal touch, normal pinprick, normal vibration, normal proprioception   CEREBELLAR FUNCTION:  Intact fine motor control over upper limbs and lower limbs   REFLEX FUNCTION:  Symmetric in upper and lower extremities, no Babinski sign   STATION and GAIT Not tested         Investigations:      Laboratory Testing:  Recent Results (from the past 24 hour(s))   Magnesium    Collection Time: 02/18/23  9:36 AM   Result Value Ref Range    Magnesium 1.8 1.6 - 2.6 mg/dL   PREGNANCY, URINE    Collection Time: 02/18/23 11:08 AM   Result Value Ref Range    HCG(Urine) Pregnancy Test NEGATIVE NEGATIVE   POC Glucose Fingerstick    Collection Time: 02/18/23 12:07 PM   Result Value Ref Range    POC Glucose 155 (H) 65 - 105 mg/dL   Prolactin    Collection Time: 02/18/23 12:49 PM   Result Value Ref Range    Prolactin 12.62 4.79 - 23.30 ng/mL         Assessment :      Primary Problem  Uncontrolled seizures ([facility]    Active Hospital Problems    Diagnosis Date Noted    Uncontrolled seizures ([facility] [R56.9] 02/19/2023     Priority: Medium    Seizure-like activity ([facility] [R56.9] 02/16/2023     Priority: Medium       79-year-old female with past medical history of migraine headaches, asthma and depression came as a transfer from [facility] for concern of seizure-like activity. Generalized shaking episodes; epileptic versus PNES  History of anxiety and depression  History of migrainous headache    Plan:     Patient status Admit as  in the  Progressive Unit/Step down    CT head without contrast 2/16/2023 at [facility]: Unremarkable. MRI brain with and without contrast: Unremarkable  Routine EEG performed at [facility]: Normal  Continue video EEG monitoring. Seizure precautions. Continue Keppra 500 twice daily  Continue Vimpat 200 twice daily  If no evidence of epileptic phenomenon AEDs can be slowly weaned off. For headaches give IV magnesium 2 g as needed and Topamax 25 nightly. Psych consult for evaluation of PNES. Please notify the on-call nurse. Regular diet. Lovenox for DVT prophylaxis      Consultations:   IP CONSULT TO PSYCHIATRY      Follow-up further recommendations after discussing the case with attending  The plan was discussed with the patient, patient's family and the medical staff. Patient is admitted as inpatient status because of co-morbidities listed above, severity of signs and symptoms as outlined, requirement for current medical therapies and most importantly because of direct risk to patient if care not provided in a hospital setting.     Samanta Diggs MD  Neurology Resident PGY-3  2/19/2023  2:41 AM    Copy sent to Dr. Mayuri Meneses MD

## 2023-02-19 NOTE — PROGRESS NOTES
Comprehensive Nutrition Assessment    Type and Reason for Visit:  Initial, Positive Nutrition Screen (Weight Loss; Poor Intakes/Appetite)    Nutrition Recommendations/Plan:   Continue current diet. Encourage/monitor PO intakes as tolerated. Monitor need for ONS. Will monitor labs, weights, and plan of care. Malnutrition Assessment:  Malnutrition Status: At risk for malnutrition (02/19/23 6626)    Context:  Acute Illness     Findings of the 6 clinical characteristics of malnutrition:  Energy Intake:  75% or less of estimated energy requirements for 7 or more days  Weight Loss:  Unable to assess - Reported h/o recent weight loss. Body Fat Loss:  No significant body fat loss   Muscle Mass Loss:  No significant muscle mass loss  Fluid Accumulation:  No significant fluid accumulation   Strength:  Not Performed    Nutrition Assessment:    Pt admitted for seizure activity. Pt reports having a decreased appetite and weight loss x past 1.5 months. Reports 50 lb weight loss over this time frame. Weight reviewed per chart with h/o weight loss noted. Pt reports tolerating PO intakes and consuming about 50% of meals. Will monitor need for oral supplements. Labs/Meds reviewed. Nutrition Related Findings:    Labs/Meds reviewed. Wound Type: None       Current Nutrition Intake & Therapies:    Average Meal Intake: 51-75%  Average Supplements Intake: None Ordered  ADULT DIET; Regular    Anthropometric Measures:  Height: 5' (152.4 cm)  Ideal Body Weight (IBW): 100 lbs (45 kg)    Admission Body Weight: 198 lb 10.2 oz (90.1 kg)  Current Body Weight: 198 lb 10.2 oz (90.1 kg), 198.6 % IBW.  Weight Source: Bed Scale  Current BMI (kg/m2): 38.8  Usual Body Weight: 230 lb (104.3 kg) (per pt report - 238 lb 8 oz on 11/4/21 per chart review)  % Weight Change (Calculated): -13.6                    BMI Categories: Obese Class 2 (BMI 35.0 -39.9)    Estimated Daily Nutrient Needs:  Energy Requirements Based On: Formula  Weight Used for Energy Requirements: Admission  Energy (kcal/day): 0490-7975 kcals/day  Weight Used for Protein Requirements: Ideal  Protein (g/day): 70 gm pro/day  Method Used for Fluid Requirements: ml/Kg  Fluid (ml/day): 25-30 mL/kg = 7752-8935 mL/day or per MD    Nutrition Diagnosis:   Predicted inadequate energy intake related to  (appetite) as evidenced by weight loss, poor intake prior to admission (reported weight loss; variable PO intakes)    Nutrition Interventions:   Food and/or Nutrient Delivery: Continue Current Diet  Nutrition Education/Counseling: No recommendation at this time  Coordination of Nutrition Care: Continue to monitor while inpatient  Plan of Care discussed with: Patient    Goals:  Previous Goal Met:  (Goal Set)  Goals: Meet at least 75% of estimated needs, prior to discharge       Nutrition Monitoring and Evaluation:   Behavioral-Environmental Outcomes: None Identified  Food/Nutrient Intake Outcomes: Food and Nutrient Intake  Physical Signs/Symptoms Outcomes: Biochemical Data, GI Status, Weight, Skin, Meal Time Behavior    Discharge Planning:     Too soon to determine     Manuel Garcia RD, LD  Contact: [phone]

## 2023-02-19 NOTE — DISCHARGE SUMMARY
[facility]  Office: [address], DO, Enoc Ards, DO, Carlos Nevada, DO, Preston Swartz Blood, DO, Doyle Omalley MD, Fe Chua MD, Lana Kemp MD, Taran Pacheco MD,  Tom Aponte MD, Kyara Voss MD, Samanta Sampson, DO, Tameka Chi MD,  Mari Gray MD, Bekah Ro MD, Tori Moritz, DO, Eliseo Pinto MD, Lory Edwards MD, Cecil Buerger, DO, Renaldo Gray MD, Hugh Cruz MD, Royce Ramsey MD, Sylvie Marie MD, Raphael Davis DO, Lj Coats MD, Dyana Lane MD, Rumalda Snellen, CNP,  Daniel Garcia, CNP, Lance Veras, CNP, Eduardo Flores, CNP,  Luba Allan, [facility], Gisel Martinez, CNP, Eleanor Catrer, CNP, Radha Diaz, CNP, Sally Garcia, CNP, Meagan Valdovinos, CNP, David Jones PA-C, Marlon Hoff, CNS, Naomi Kam, CNP, Lluvia Mcknight, [facility]         [facility] [facility]    Discharge Summary     Patient ID: Radha Gonzalez  :  2000   MRN: [id number]     ACCOUNT:  [de-identified]   Patient's PCP: Matt Drake MD  Admit Date: 2023   Discharge Date: 2023     Length of Stay: 2  Code Status:  Full Code  Admitting Physician: oCry Li MD  Discharge Physician: Cory Li MD     Active Discharge Diagnoses:     Hospital Problem Lists:  Principal Problem:    Seizure-like activity [facility])  Active Problems:    Acute cystitis without hematuria    Tobacco use    Seizure [facility])    WPW syndrome    Mild intermittent asthma without complication    Intractable chronic migraine without aura and without status migrainosus  Resolved Problems:    * No resolved hospital problems.  *      Admission Condition:  poor     Discharged Condition: poor    Hospital Stay:     Hospital Course:  Radha Gonzalez is a 20 y.o. female who was admitted for the management of  Seizure-like activity [facility]) , presented to ER with Seizures (Pt states she had 3 seizures today and 3 last night. ) and Headache       Patient with past medical history of asthma, family history of seizures presented to the ER with complaints of headache and seizures. Patient states she had 3 seizures overnight and 3 seizures today and that her boyfriend witnessed one of the seizures and he claimed that lasted about 5 minutes. Her boyfriend states that she would become very stiff and would not respond to him and remained ' out of it\" for a few minutes after each seizure. On arrival to the ER the patient is not postictal and she denies a history of seizure. CT head was negative. UA is positive for urinary tract infection. Neuro was consulted and they recommended loading Keppra. Patient continued to have intermittent seizure episodes on floor lasting around 50 seconds to 1 minute. Patient was not postictal post seizure. Patient was on Lamictal and Keppra per neurology. As patient continued to have seizure episodes decision was made to transfer to [place] for LTM monitoring to rule out pseudoseizures. Psych consulted. Patient was on rocephin for uti. Initial EKG showed finding of WPW syndrome. EKG was repeated and showed repolarization changes. Patient was seen by cardiology and recommended outpatient follow-up.       Significant therapeutic interventions: As above    Significant Diagnostic Studies:   Labs / Micro:  CBC:   Lab Results   Component Value Date/Time    WBC 6.2 02/16/2023 03:20 PM    RBC 5.34 02/16/2023 03:20 PM    HGB 14.4 02/16/2023 03:20 PM    HCT 44.6 02/16/2023 03:20 PM    MCV 83.5 02/16/2023 03:20 PM    MCH 27.0 02/16/2023 03:20 PM    MCHC 32.3 02/16/2023 03:20 PM    RDW 13.0 02/16/2023 03:20 PM     02/16/2023 03:20 PM     BMP:    Lab Results   Component Value Date/Time    GLUCOSE 100 02/17/2023 05:47 AM     02/17/2023 05:47 AM    K 4.4 02/17/2023 05:47 AM     02/17/2023 05:47 AM    CO2 23 02/17/2023 05:47 AM    ANIONGAP 8 02/17/2023 05:47 AM    BUN 18 02/17/2023 05:47 AM    CREATININE 0.63 02/17/2023 05:47 AM    BUNCRER 29 02/17/2023 05:47 AM    CALCIUM 8.6 02/17/2023 05:47 AM    LABGLOM >60 02/17/2023 05:47 AM     HFP:  No results found for: ALB, PROT  CMP:    Lab Results   Component Value Date/Time    GLUCOSE 100 02/17/2023 05:47 AM     02/17/2023 05:47 AM    K 4.4 02/17/2023 05:47 AM     02/17/2023 05:47 AM    CO2 23 02/17/2023 05:47 AM    BUN 18 02/17/2023 05:47 AM    CREATININE 0.63 02/17/2023 05:47 AM    ANIONGAP 8 02/17/2023 05:47 AM    LABGLOM >60 02/17/2023 05:47 AM    CALCIUM 8.6 02/17/2023 05:47 AM        Radiology:  CT HEAD WO CONTRAST    Result Date: 2/16/2023  No acute intracranial findings. XR CHEST PORTABLE    Result Date: 2/16/2023  No radiographic evidence of an acute cardiopulmonary process. MRI BRAIN W WO CONTRAST    Result Date: 2/18/2023  No acute intracranial abnormality visualized. RECOMMENDATIONS: Unavailable       Consultations:    Consults:     Final Specialist Recommendations/Findings:   IP CONSULT TO INTERNAL MEDICINE  IP CONSULT TO NEUROLOGY  IP CONSULT TO CARDIOLOGY  IP CONSULT TO PSYCHIATRY      The patient was seen and examined on day of discharge and this discharge summary is in conjunction with any daily progress note from day of discharge. Discharge plan:     Disposition: To a non-[facility] facility    Physician Follow Up: Follow up pcp    Requiring Further Evaluation/Follow Up POST HOSPITALIZATION/Incidental Findings: transferred to [facility]    \  Electronically signed by   Dennis Polanco MD  2/19/2023  2:59 PM      Thank you Dr. Chon Santoyo MD for the opportunity to be involved in this patient's care.

## 2023-02-19 NOTE — PLAN OF CARE
Problem: Discharge Planning  Goal: Discharge to home or other facility with appropriate resources  Outcome: Completed     Problem: ABCDS Injury Assessment  Goal: Absence of physical injury  Outcome: Completed     Problem: Neurosensory - Adult  Goal: Achieves stable or improved neurological status  Outcome: Completed     Problem: Neurosensory - Adult  Goal: Absence of seizures  Outcome: Completed     Problem: Neurosensory - Adult  Goal: Remains free of injury related to seizures activity  Outcome: Completed     Problem: Neurosensory - Adult  Goal: Achieves maximal functionality and self care  Outcome: Completed     Problem: Nutrition Deficit:  Goal: Optimize nutritional status  Outcome: Completed

## 2023-02-19 NOTE — PLAN OF CARE
Problem: Discharge Planning  Goal: Discharge to home or other facility with appropriate resources  2/19/2023 0415 by Ange Laguna RN  Outcome: Progressing  2/19/2023 0415 by Ange Laguna, RN  Outcome: Progressing     Problem: Safety - Adult  Goal: Free from fall injury  Outcome: Progressing

## 2023-02-19 NOTE — PROGRESS NOTES
Pt called Rn into room to let  writer know that they had a seizure unwitnessed. Pt stated that the episode lasted a few seconds, pt now complaining of a headache. Neurology and primary notified, prn Fioricet ordered for the headache. Pt alert able to follow commands, pt able to answer questions.

## 2023-02-19 NOTE — PROGRESS NOTES
Pt transferring to [facility] picked up pt. Tele removed. Report called, and given to KAILASH Leon.  IV access still in place

## 2023-02-19 NOTE — PLAN OF CARE
Problem: Discharge Planning  Goal: Discharge to home or other facility with appropriate resources  2/19/2023 0748 by Reji Grady RN  Outcome: Progressing  2/19/2023 0415 by Lilia Rhoades RN  Outcome: Progressing  2/19/2023 0415 by Lilia Rhoades RN  Outcome: Progressing     Problem: Safety - Adult  Goal: Free from fall injury  2/19/2023 0748 by Reji Grady RN  Outcome: Progressing  2/19/2023 0415 by Lilia Rhoades RN  Outcome: Progressing

## 2023-02-19 NOTE — CARE COORDINATION
Case Management Assessment  Initial Evaluation    Date/Time of Evaluation: 2/19/2023 5:01 PM  Assessment Completed by: Igor Galan RN    If patient is discharged prior to next notation, then this note serves as note for discharge by case management. Patient Name: Alfreida Hatchet                   YOB: 2000  Diagnosis: Uncontrolled seizures ([facility] [address] [R56.9]  Seizure-like activity ([facility] [address] [R56.9]                   Date / Time: 2/19/2023  1:51 AM    Patient Admission Status: Inpatient   Readmission Risk (Low < 19, Mod (19-27), High > 27): Readmission Risk Score: 8.3    Current PCP: Aleah Jiménez MD  PCP verified by CM? Yes    Chart Reviewed: Yes      History Provided by: Patient  Patient Orientation: Alert and Oriented    Patient Cognition: Alert    Hospitalization in the last 30 days (Readmission):  No    If yes, Readmission Assessment in CM Navigator will be completed. Advance Directives:      Code Status: Full Code   Patient's Primary Decision Maker is: Legal Next of Kin      Discharge Planning:    Patient lives with: Spouse/Significant Other Type of Home: Apartment  Primary Care Giver: Self  Patient Support Systems include: Spouse/Significant Other   Current Financial resources: Medicaid  Current community resources: None  Current services prior to admission: None            Current DME:              Type of Home Care services:  None    ADLS  Prior functional level: Independent in ADLs/IADLs  Current functional level: Independent in ADLs/IADLs    PT AM-PAC:   /24  OT AM-PAC:   /24    Family can provide assistance at DC: Yes  Would you like Case Management to discuss the discharge plan with any other family members/significant others, and if so, who?     Plans to Return to Present Housing: Yes  Other Identified Issues/Barriers to RETURNING to current housing: N/A  Potential Assistance needed at discharge: N/A            Potential DME:    Patient expects to discharge to: [address] [place] [address] transportation at discharge: Family    Financial    Payor: MEDICAID [place] / Plan: [employer] DEPT OF JOB / Product Type: *No Product type* /     Does insurance require precert for SNF: No    Potential assistance Purchasing Medications: No  Meds-to-Beds request:        [facility] #[id number] Casi Messer [phone] Katarina Logan [phone]  [address] [facility] [address]  Phone: [phone] Fax: [phone]      Notes:    Factors facilitating achievement of predicted outcomes: Cooperative and Pleasant    Barriers to discharge: Limited insight into deficits    Additional Case Management Notes: CM spoke with patient at bedside to discuss transitional planning. Patient transfer from [facility] for LTME. Plan to return home with S/O at discharge. Has transportation. The Plan for Transition of Care is related to the following treatment goals of Uncontrolled seizures ([address] [R56.9]  Seizure-like activity ([address] [T03.7]    IF APPLICABLE: The Patient and/or patient representative Crystal and her family were provided with a choice of provider and agrees with the discharge plan. Freedom of choice list with basic dialogue that supports the patient's individualized plan of care/goals and shares the quality data associated with the providers was provided to: Patient   Patient Representative Name:       The Patient and/or Patient Representative Agree with the Discharge Plan?  Yes    Elizabet Rucker RN  Case Management Department  Ph: [phone]

## 2023-02-20 LAB
ABSOLUTE EOS #: 0.23 K/UL (ref 0–0.44)
ABSOLUTE IMMATURE GRANULOCYTE: <0.03 K/UL (ref 0–0.3)
ABSOLUTE LYMPH #: 2.18 K/UL (ref 1.1–3.7)
ABSOLUTE MONO #: 0.6 K/UL (ref 0.1–1.2)
ANION GAP SERPL CALCULATED.3IONS-SCNC: 8 MMOL/L (ref 9–17)
BASOPHILS # BLD: 1 % (ref 0–2)
BASOPHILS ABSOLUTE: 0.08 K/UL (ref 0–0.2)
BUN SERPL-MCNC: 13 MG/DL (ref 6–20)
CALCIUM SERPL-MCNC: 8.7 MG/DL (ref 8.6–10.4)
CHLORIDE SERPL-SCNC: 103 MMOL/L (ref 98–107)
CO2 SERPL-SCNC: 21 MMOL/L (ref 20–31)
CREAT SERPL-MCNC: 0.62 MG/DL (ref 0.5–0.9)
EOSINOPHILS RELATIVE PERCENT: 4 % (ref 1–4)
GFR SERPL CREATININE-BSD FRML MDRD: >60 ML/MIN/1.73M2
GLUCOSE SERPL-MCNC: 95 MG/DL (ref 70–99)
HCT VFR BLD AUTO: 43.6 % (ref 36.3–47.1)
HGB BLD-MCNC: 14.1 G/DL (ref 11.9–15.1)
IMMATURE GRANULOCYTES: 0 %
LYMPHOCYTES # BLD: 36 % (ref 24–43)
MCH RBC QN AUTO: 27.5 PG (ref 25.2–33.5)
MCHC RBC AUTO-ENTMCNC: 32.3 G/DL (ref 28.4–34.8)
MCV RBC AUTO: 85 FL (ref 82.6–102.9)
MONOCYTES # BLD: 10 % (ref 3–12)
NRBC AUTOMATED: 0 PER 100 WBC
PDW BLD-RTO: 13 % (ref 11.8–14.4)
PLATELET # BLD AUTO: 359 K/UL (ref 138–453)
PMV BLD AUTO: 9.7 FL (ref 8.1–13.5)
POTASSIUM SERPL-SCNC: 3.9 MMOL/L (ref 3.7–5.3)
RBC # BLD: 5.13 M/UL (ref 3.95–5.11)
SEG NEUTROPHILS: 49 % (ref 36–65)
SEGMENTED NEUTROPHILS ABSOLUTE COUNT: 2.97 K/UL (ref 1.5–8.1)
SODIUM SERPL-SCNC: 132 MMOL/L (ref 135–144)
WBC # BLD AUTO: 6.1 K/UL (ref 3.5–11.3)

## 2023-02-20 PROCEDURE — 95714 VEEG EA 12-26 HR UNMNTR: CPT

## 2023-02-20 PROCEDURE — 6360000002 HC RX W HCPCS: Performed by: STUDENT IN AN ORGANIZED HEALTH CARE EDUCATION/TRAINING PROGRAM

## 2023-02-20 PROCEDURE — 6370000000 HC RX 637 (ALT 250 FOR IP): Performed by: STUDENT IN AN ORGANIZED HEALTH CARE EDUCATION/TRAINING PROGRAM

## 2023-02-20 PROCEDURE — 2060000000 HC ICU INTERMEDIATE R&B

## 2023-02-20 PROCEDURE — 36415 COLL VENOUS BLD VENIPUNCTURE: CPT

## 2023-02-20 PROCEDURE — 95720 EEG PHY/QHP EA INCR W/VEEG: CPT | Performed by: PSYCHIATRY & NEUROLOGY

## 2023-02-20 PROCEDURE — 85025 COMPLETE CBC W/AUTO DIFF WBC: CPT

## 2023-02-20 PROCEDURE — 6370000000 HC RX 637 (ALT 250 FOR IP)

## 2023-02-20 PROCEDURE — 99232 SBSQ HOSP IP/OBS MODERATE 35: CPT | Performed by: PSYCHIATRY & NEUROLOGY

## 2023-02-20 PROCEDURE — 80048 BASIC METABOLIC PNL TOTAL CA: CPT

## 2023-02-20 PROCEDURE — 2580000003 HC RX 258: Performed by: STUDENT IN AN ORGANIZED HEALTH CARE EDUCATION/TRAINING PROGRAM

## 2023-02-20 RX ORDER — TOPIRAMATE 25 MG/1
25 TABLET ORAL DAILY
Status: DISCONTINUED | OUTPATIENT
Start: 2023-02-20 | End: 2023-02-21 | Stop reason: HOSPADM

## 2023-02-20 RX ADMIN — LEVETIRACETAM 500 MG: 500 TABLET, FILM COATED ORAL at 08:38

## 2023-02-20 RX ADMIN — SODIUM CHLORIDE, PRESERVATIVE FREE 10 ML: 5 INJECTION INTRAVENOUS at 08:39

## 2023-02-20 RX ADMIN — ACETAMINOPHEN 650 MG: 325 TABLET ORAL at 16:50

## 2023-02-20 RX ADMIN — LACOSAMIDE 200 MG: 100 TABLET, FILM COATED ORAL at 08:38

## 2023-02-20 RX ADMIN — SODIUM CHLORIDE, PRESERVATIVE FREE 10 ML: 5 INJECTION INTRAVENOUS at 20:16

## 2023-02-20 RX ADMIN — ONDANSETRON 4 MG: 4 TABLET, ORALLY DISINTEGRATING ORAL at 07:59

## 2023-02-20 RX ADMIN — LACOSAMIDE 200 MG: 100 TABLET, FILM COATED ORAL at 20:18

## 2023-02-20 RX ADMIN — ENOXAPARIN SODIUM 40 MG: 100 INJECTION SUBCUTANEOUS at 08:39

## 2023-02-20 RX ADMIN — TOPIRAMATE 25 MG: 25 TABLET, FILM COATED ORAL at 08:38

## 2023-02-20 RX ADMIN — LEVETIRACETAM 500 MG: 500 TABLET, FILM COATED ORAL at 20:17

## 2023-02-20 RX ADMIN — ACETAMINOPHEN 650 MG: 325 TABLET ORAL at 07:44

## 2023-02-20 RX ADMIN — ACETAMINOPHEN 650 MG: 325 TABLET ORAL at 23:35

## 2023-02-20 ASSESSMENT — ENCOUNTER SYMPTOMS
SHORTNESS OF BREATH: 0
PHOTOPHOBIA: 0
ABDOMINAL PAIN: 0
ABDOMINAL DISTENTION: 0
CHOKING: 0
SORE THROAT: 0
RHINORRHEA: 0
BACK PAIN: 0
NAUSEA: 1
COUGH: 0
DIARRHEA: 0
COLOR CHANGE: 0
VOMITING: 1
CONSTIPATION: 0
WHEEZING: 0

## 2023-02-20 ASSESSMENT — PAIN DESCRIPTION - ORIENTATION: ORIENTATION: MID

## 2023-02-20 ASSESSMENT — PAIN DESCRIPTION - LOCATION
LOCATION: HEAD

## 2023-02-20 ASSESSMENT — PAIN DESCRIPTION - DESCRIPTORS
DESCRIPTORS: ACHING

## 2023-02-20 ASSESSMENT — PAIN SCALES - GENERAL
PAINLEVEL_OUTOF10: 7
PAINLEVEL_OUTOF10: 1
PAINLEVEL_OUTOF10: 2
PAINLEVEL_OUTOF10: 3
PAINLEVEL_OUTOF10: 1

## 2023-02-20 ASSESSMENT — PAIN - FUNCTIONAL ASSESSMENT: PAIN_FUNCTIONAL_ASSESSMENT: PREVENTS OR INTERFERES SOME ACTIVE ACTIVITIES AND ADLS

## 2023-02-20 NOTE — CARE COORDINATION
02/20/23 1128   Readmission Assessment   Number of Days since last admission? 1-7 days   Previous Disposition Home with Family   Who is being Interviewed Patient   What was the patient's/caregiver's perception as to why they think they needed to return back to the hospital? Other (Comment)  (seizure, she was at [facility] [address] couple days ago and transferred here. She states has not been in hospital w/in 30 days.)   Did you visit your Primary Care Physician after you left the hospital, before you returned this time? No   Why weren't you able to visit your PCP? Did not have an appointment   Did you see a specialist, such as Cardiac, Pulmonary, Orthopedic Physician, etc. after you left the hospital? No   Who advised the patient to return to the hospital? Other (Comment)  ([address] witnesses seizure and called 911 and called her mother)   Does the patient report anything that got in the way of taking their medications? No   In our efforts to provide the best possible care to you and others like you, can you think of anything that we could have done to help you after you left the hospital the first time, so that you might not have needed to return so soon?  Teach back instructions regarding management of illness

## 2023-02-20 NOTE — CARE COORDINATION
Transitional planning:  Psych consult. [place] Parkinson's White syndrome. On LTME/witnessed seizure. Nausea and vomiting. 1128 RAC completed. Pt on LTME. Alert and oriented x4.

## 2023-02-20 NOTE — PROGRESS NOTES
[facility] Neurology   [address] [place] [address]    Progress Note             Date:   2/20/2023  Patient name:  Shaq Duncan  Date of admission:  2/19/2023  1:51 AM  MRN:   [id number]  Account:  [de-identified]  YOB: 2000  PCP:    Juan José Mendoza MD  Room:   [address] [place] [address]  Code Status:    Full Code    Chief Complaint:     Seizure and headache    Interval hx: The patient was seen and examined at bedside. Is vitally stable, alert and oriented x 3. No acute events overnight. LTM E report pending  Had a seizure event less than 1 minute, continued nausea and vomiting given Zofran today patient was also asking for Tylenol prior to the event due to headache which is now 2/10  on Keppra 500 mg twice daily, Vimpat 200 mg daily  On Topamax 25 mg daily  Psychiatry consult        Brief History of Present Illness: This is a 23-year-old female past medical history of migraine headaches, asthma, depression, anxiety, came as a transfer from [facility] for seizure-like activity. Reportedly had 3 seizures the night prior to admission, also had 3 more seizures while at [facility]. Seizures typically lasting 1 to 2 minutes described as shaking of all of her extremities, followed by a period of postictal lethargy. Denies any tongue biting, urinary or fecal incontinence. Was started on Keppra 500 mg twice daily, she was not having more seizures than was started on Vimpat 100 mg twice daily after loading dose. Had an EEG which was normal.  Transferred to [facility] for LTME monitoring. Recently has been having stressors. Admits that she has headache prior to seizure activity. Was also having headache and was started on Topamax 25 mg daily      Past Medical History:     Past Medical History:   Diagnosis Date    Asthma         Past Surgical History:     No past surgical history on file.      Medications Prior to Admission:     Prior to Admission medications    Medication Sig Start Date End Date Taking? Authorizing Provider   ibuprofen (ADVIL;MOTRIN) 200 MG tablet Take 200 mg by mouth every 6 hours as needed for Pain   Yes Historical Provider, MD        Allergies:     Aspirin    Social History:     Tobacco:    reports that she has been smoking. She has never used smokeless tobacco.  Alcohol:      reports current alcohol use. Drug Use:  reports no history of drug use. Family History:     Family History   Problem Relation Age of Onset    Diabetes Mother     No Known Problems Father     Seizures Brother         since birth       Review of Systems:     Review of Systems   Constitutional:  Negative for chills, fatigue and fever. HENT:  Negative for rhinorrhea and sore throat. Eyes:  Negative for photophobia and visual disturbance. Respiratory:  Negative for cough, choking, shortness of breath and wheezing. Cardiovascular:  Negative for chest pain, palpitations and leg swelling. Gastrointestinal:  Positive for nausea and vomiting. Negative for abdominal distention, abdominal pain, constipation and diarrhea. Endocrine: Negative for polyuria. Genitourinary:  Negative for difficulty urinating, dysuria, enuresis, frequency and hematuria. Musculoskeletal:  Negative for arthralgias and back pain. Skin:  Negative for color change and wound. Neurological:  Positive for seizures and headaches. Negative for tremors, weakness and light-headedness. Hematological:  Does not bruise/bleed easily. Psychiatric/Behavioral:  Negative for agitation, behavioral problems, confusion and hallucinations. The patient is not nervous/anxious.       Physical Exam:   /60   Pulse 63   Temp 98 °F (36.7 °C) (Oral)   Resp 18   Ht 5' (1.524 m)   Wt 198 lb 10.2 oz (90.1 kg)   LMP 2023   SpO2 98%   BMI 38.79 kg/m²   Temp (24hrs), Av.2 °F (36.8 °C), Min:97.8 °F (36.6 °C), Max:98.7 °F (37.1 °C)    Recent Labs     23  1207   POCGLU 155*       Intake/Output Summary (Last 24 hours) at 2/20/2023 9176  Last data filed at 2/19/2023 2035  Gross per 24 hour   Intake 220 ml   Output --   Net 220 ml         Neurologic Exam     GENERAL  Appears comfortable and in no distress   HEENT  NC/ AT   HEART  S1 and S2 heard; palpation of pulses: radial pulse    NECK  Supple and no bruits heard   MENTAL STATUS:  Alert, oriented, intact memory, no confusion, normal speech, normal language, no hallucination or delusion   CRANIAL NERVES: II     -      Visual fields intact to confrontation  III,IV,VI -  PERR, EOMs full, no ptosis  V     -     Normal facial sensation   VII    -     Normal facial symmetry  VIII   -     Intact hearing   IX,X -     Symmetrical palate  XI    -     Symmetrical shoulder shrug  XII   -     Midline tongue, no atrophy    MOTOR FUNCTION: RUE: Significant for good strength of grade 5/5 in proximal and distal muscle groups   LUE: Significant for good strength of grade 5/5 in proximal and distal muscle groups   RLE: Significant for good strength of grade 5/5 in proximal and distal muscle groups   LLE: Significant for good strength of grade 5/5 in proximal and distal muscle groups      Normal bulk, normal tone and no involuntary movements, no tremor   SENSORY FUNCTION:  Normal touch, normal pinprick, normal vibration, normal proprioception   CEREBELLAR FUNCTION:  Intact fine motor control over upper limbs and lower limbs   REFLEX FUNCTION:  Symmetric in upper and lower extremities, no Babinski sign   STATION and GAIT  Normal gait and tandem station, normal tip toes and heel walking       Investigations:      Laboratory Testing:  Recent Results (from the past 24 hour(s))   Basic Metabolic Panel w/ Reflex to MG    Collection Time: 02/20/23  4:51 AM   Result Value Ref Range    Glucose 95 70 - 99 mg/dL    BUN 13 6 - 20 mg/dL    Creatinine 0.62 0.50 - 0.90 mg/dL    Est, Glom Filt Rate >60 >60 mL/min/1.73m2    Calcium 8.7 8.6 - 10.4 mg/dL    Sodium 132 (L) 135 - 144 mmol/L    Potassium 3.9 3.7 - 5.3 mmol/L    Chloride 103 98 - 107 mmol/L    CO2 21 20 - 31 mmol/L    Anion Gap 8 (L) 9 - 17 mmol/L   CBC with Auto Differential    Collection Time: 02/20/23  4:51 AM   Result Value Ref Range    WBC 6.1 3.5 - 11.3 k/uL    RBC 5.13 (H) 3.95 - 5.11 m/uL    Hemoglobin 14.1 11.9 - 15.1 g/dL    Hematocrit 43.6 36.3 - 47.1 %    MCV 85.0 82.6 - 102.9 fL    MCH 27.5 25.2 - 33.5 pg    MCHC 32.3 28.4 - 34.8 g/dL    RDW 13.0 11.8 - 14.4 %    Platelets 796 958 - 300 k/uL    MPV 9.7 8.1 - 13.5 fL    NRBC Automated 0.0 0.0 per 100 WBC    Seg Neutrophils 49 36 - 65 %    Lymphocytes 36 24 - 43 %    Monocytes 10 3 - 12 %    Eosinophils % 4 1 - 4 %    Basophils 1 0 - 2 %    Immature Granulocytes 0 0 %    Segs Absolute 2.97 1.50 - 8.10 k/uL    Absolute Lymph # 2.18 1.10 - 3.70 k/uL    Absolute Mono # 0.60 0.10 - 1.20 k/uL    Absolute Eos # 0.23 0.00 - 0.44 k/uL    Basophils Absolute 0.08 0.00 - 0.20 k/uL    Absolute Immature Granulocyte <0.03 0.00 - 0.30 k/uL     Recent Labs     02/20/23  0451   WBC 6.1   RBC 5.13*   HGB 14.1   HCT 43.6   MCV 85.0   MCH 27.5   MCHC 32.3   RDW 13.0      MPV 9.7     Recent Labs     02/20/23  0451   *   K 3.9      CO2 21   BUN 13   CREATININE 0.62   GLUCOSE 95   CALCIUM 8.7     No results found for: LABA1C    Assessment :      Primary Problem  Seizures [facility])    Active Hospital Problems    Diagnosis Date Noted    Seizures ([facility].) [R56.9] 02/19/2023     Priority: Medium    Depression [F32. A] 02/19/2023     Priority: Medium    Anxiety [F41.9] 02/19/2023     Priority: Medium    Seizure-[facility] activity ([facility] [R56.9] 02/16/2023     Priority: Medium       Patient is a 20 y.o. female multiple seizure-like activity, transferred from [facility] for LTM E, came in here with Keppra 500 twice daily, Vimpat 200 mg twice daily, EEG at [facility] was normal.  CT head was unremarkable.   MRI brain was normal.  Impression: Possible seizure disorder, possible psychogenic nonepileptic seizure, stress-induced seizure, migraine headache    Plan:     1 day LTM E was normal, no events were captured  Continue LTME   Psychiatry consult pending  Vimpat 200 mg  Continue with Keppra 500 mg twice daily  Continue with Topamax 25 mg daily for migraine prophylaxis  Recommend using OTC pain meds for abortive treatment. No driving for at least 6 months. No heavy lifting for at least 6 months  No bathing or swimming unsupervised  Avoid locking doors, it prevents some to help you if needed  Avoid stairs unsupervised  Avoid cooking unsupervised        Follow-up further recommendations after discussing the case with attending  The plan was discussed with the patient, patient's family and the medical staff. Consultations:   IP CONSULT TO PSYCHIATRY  IP CONSULT TO IV TEAM  IP CONSULT TO PSYCHIATRY    Patient is admitted as inpatient status because of co-morbidities listed above, severity of signs and symptoms as outlined, requirement for current medical therapies and most importantly because of direct risk to patient if care not provided in a hospital setting.     Gal Page MD  Neurology Resident PGY-2  2/20/2023  8:23 AM    Copy sent to Dr. Sally Cross MD

## 2023-02-20 NOTE — PLAN OF CARE
Problem: Discharge Planning  Goal: Discharge to home or other facility with appropriate resources  2/20/2023 1307 by Eric Rogel RN  Outcome: Progressing  2/20/2023 0349 by Darylene Petties, RN  Outcome: Progressing  Flowsheets (Taken 2/20/2023 5682)  Discharge to home or other facility with appropriate resources:   Identify barriers to discharge with patient and caregiver   Arrange for needed discharge resources and transportation as appropriate   Identify discharge learning needs (meds, wound care, etc)  2/20/2023 0349 by Darylene Petties, RN  Outcome: Progressing  Flowsheets (Taken 2/20/2023 2595)  Discharge to home or other facility with appropriate resources:   Identify barriers to discharge with patient and caregiver   Arrange for needed discharge resources and transportation as appropriate   Identify discharge learning needs (meds, wound care, etc)     Problem: Safety - Adult  Goal: Free from fall injury  2/20/2023 1307 by Eric Rogel RN  Outcome: Progressing  2/20/2023 0349 by Darylene Petties, RN  Outcome: Progressing  Flowsheets (Taken 2/20/2023 0349)  Free From Fall Injury:   Based on caregiver fall risk screen, instruct family/caregiver to ask for assistance with transferring infant if caregiver noted to have fall risk factors   Instruct family/caregiver on patient safety  2/20/2023 0349 by Darylene Petties, RN  Outcome: Progressing  [address] (Taken 2/20/2023 0349)  Free From Fall Injury:   Based on caregiver fall risk screen, instruct family/caregiver to ask for assistance with transferring infant if caregiver noted to have fall risk factors   Instruct family/caregiver on patient safety     Problem: Pain  Goal: Verbalizes/displays adequate comfort level or baseline comfort level  2/20/2023 1307 by Eric Rogel RN  Outcome: Progressing  2/20/2023 0349 by Darylene Petties, RN  Outcome: Progressing  Flowsheets (Taken 2/20/2023 0349)  Verbalizes/displays adequate comfort level or baseline comfort level:   Encourage patient to monitor pain and request assistance   Assess pain using appropriate pain scale   Administer analgesics based on type and severity of pain and evaluate response  2/20/2023 0349 by Aj Cardona RN  Outcome: Progressing  Flowsheets (Taken 2/20/2023 9854)  Verbalizes/displays adequate comfort level or baseline comfort level:   Encourage patient to monitor pain and request assistance   Assess pain using appropriate pain scale   Administer analgesics based on type and severity of pain and evaluate response     Problem: Neurosensory - Adult  Goal: Achieves stable or improved neurological status  2/20/2023 1307 by Jennifer Christie RN  Outcome: Progressing  2/20/2023 0349 by Aj Cardona RN  Outcome: Progressing  Flowsheets (Taken 2/20/2023 0349)  Achieves stable or improved neurological status:   Assess for and report changes in neurological status   Monitor temperature, glucose, and sodium. Initiate appropriate interventions as ordered  2/20/2023 0349 by Aj Cardona RN  Outcome: Progressing  Flowsheets (Taken 2/20/2023 9839)  Achieves stable or improved neurological status:   Assess for and report changes in neurological status   Monitor temperature, glucose, and sodium. Initiate appropriate interventions as ordered  Goal: Absence of seizures  2/20/2023 1307 by Jennifer Christie RN  Outcome: Progressing  2/20/2023 0349 by Aj Cardona RN  Outcome: Progressing  Flowsheets (Taken 2/20/2023 0349)  Absence of seizures:   Monitor for seizure activity. If seizure occurs, document type and location of movements and any associated apnea   Administer anticonvulsants as ordered   Support airway/breathing, administer oxygen as needed   Diagnostic studies as ordered  2/20/2023 0349 by Aj Cardona RN  Outcome: Progressing  Flowsheets (Taken 2/20/2023 0349)  Absence of seizures:   Monitor for seizure activity.   If seizure occurs, document type and location of movements and any associated apnea   Administer anticonvulsants as ordered   Support airway/breathing, administer oxygen as needed   Diagnostic studies as ordered  Goal: Remains free of injury related to seizures activity  2/20/2023 1307 by Ulises Quintero RN  Outcome: Progressing  2/20/2023 0349 by Leon Tang RN  Outcome: Progressing  Flowsheets (Taken 2/20/2023 9016)  Remains free of injury related to seizure activity:   Maintain airway, patient safety  and administer oxygen as ordered   Monitor patient for seizure activity, document and report duration and description of seizure to Licensed Independent Practitioner   If seizure occurs, turn patient to side and suction secretions as needed   Reorient patient post seizure   Instruct patient/family to notify RN of any seizure activity  2/20/2023 0349 by Leon Tang RN  Outcome: Progressing  Flowsheets (Taken 2/20/2023 8021)  Remains free of injury related to seizure activity:   Maintain airway, patient safety  and administer oxygen as ordered   Monitor patient for seizure activity, document and report duration and description of seizure to Licensed Independent Practitioner   If seizure occurs, turn patient to side and suction secretions as needed   Reorient patient post seizure   Instruct patient/family to notify RN of any seizure activity

## 2023-02-20 NOTE — PROCEDURES
Referring Mallory Kelly MD  Date: 2/20/2023  Start Time:2/19/2023 @ 9885 1152  End Time: 2/20/2023 @ 0730    Indication  Patient with possible seizures. Introduction  This continuous video-EEG was acquired using a [employer] workstation at 256 samples/s. Electrodes were placed according to the International 10-20 system. Automated spike and seizure detection algorithms were applied. Video was recorded during this study. Description  During the maximal alert state, a well-regulated 9 Hz posterior dominant rhythm was seen which was symmetrical and attenuated to eye opening. No consistent focal slowing or interhemispheric asymmetry was noted. Normal sleep structures were observed. There were no interictal epileptiform discharges or electrographic seizures. Events  No events reported. Impression  Normal continuous video-EEG. .      EKG lead did not show clear arrhythmia, if still in concern consider formal EKG or correlation with telemetry. No epileptiform discharges were identified. Please note the absence of   such activity in this record cannot conclusively rule out an epileptic   disorder. If such is still clinically suspected, a repeat study with   prolonged sampling may be helpful. Forest Phalen MD  Epilepsy Board Certified. Neurology Board Certified.     Electronically Signed

## 2023-02-20 NOTE — PLAN OF CARE
Problem: Discharge Planning  Goal: Discharge to home or other facility with appropriate resources  2/20/2023 0349 by Hurshel Homans, RN  Outcome: Progressing  Flowsheets (Taken 2/20/2023 2705)  Discharge to home or other facility with appropriate resources:   Identify barriers to discharge with patient and caregiver   Arrange for needed discharge resources and transportation as appropriate   Identify discharge learning needs (meds, wound care, etc)  2/20/2023 0349 by Hurshel Homans, RN  Outcome: Progressing  Flowsheets (Taken 2/20/2023 2361)  Discharge to home or other facility with appropriate resources:   Identify barriers to discharge with patient and caregiver   Arrange for needed discharge resources and transportation as appropriate   Identify discharge learning needs (meds, wound care, etc)     Problem: Safety - Adult  Goal: Free from fall injury  2/20/2023 0349 by Hurshel Homans, RN  Outcome: Progressing  Flowsheets (Taken 2/20/2023 0349)  Free From Fall Injury:   Based on caregiver fall risk screen, instruct family/caregiver to ask for assistance with transferring infant if caregiver noted to have fall risk factors   Instruct family/caregiver on patient safety  2/20/2023 0349 by Hurshel Homans, RN  Outcome: Progressing  [address] (Taken 2/20/2023 0349)  Free From Fall Injury:   Based on caregiver fall risk screen, instruct family/caregiver to ask for assistance with transferring infant if caregiver noted to have fall risk factors   Instruct family/caregiver on patient safety     Problem: Pain  Goal: Verbalizes/displays adequate comfort level or baseline comfort level  2/20/2023 0349 by Hurshel Homans, RN  Outcome: Progressing  Flowsheets (Taken 2/20/2023 0349)  Verbalizes/displays adequate comfort level or baseline comfort level:   Encourage patient to monitor pain and request assistance   Assess pain using appropriate pain scale   Administer analgesics based on type and severity of pain and evaluate response  2/20/2023 0349 by Catherine Miller RN  Outcome: Progressing  Flowsheets (Taken 2/20/2023 3289)  Verbalizes/displays adequate comfort level or baseline comfort level:   Encourage patient to monitor pain and request assistance   Assess pain using appropriate pain scale   Administer analgesics based on type and severity of pain and evaluate response     Problem: Neurosensory - Adult  Goal: Achieves stable or improved neurological status  2/20/2023 0349 by Catherine Miller RN  Outcome: Progressing  Flowsheets (Taken 2/20/2023 0349)  Achieves stable or improved neurological status:   Assess for and report changes in neurological status   Monitor temperature, glucose, and sodium. Initiate appropriate interventions as ordered  2/20/2023 0349 by Catherine Miller RN  Outcome: Progressing  Flowsheets (Taken 2/20/2023 6537)  Achieves stable or improved neurological status:   Assess for and report changes in neurological status   Monitor temperature, glucose, and sodium. Initiate appropriate interventions as ordered  Goal: Absence of seizures  2/20/2023 0349 by Catherine Miller RN  Outcome: Progressing  Flowsheets (Taken 2/20/2023 6774)  Absence of seizures:   Monitor for seizure activity. If seizure occurs, document type and location of movements and any associated apnea   Administer anticonvulsants as ordered   Support airway/breathing, administer oxygen as needed   Diagnostic studies as ordered  2/20/2023 0349 by Catherine Miller RN  Outcome: Progressing  Flowsheets (Taken 2/20/2023 0349)  Absence of seizures:   Monitor for seizure activity.   If seizure occurs, document type and location of movements and any associated apnea   Administer anticonvulsants as ordered   Support airway/breathing, administer oxygen as needed   Diagnostic studies as ordered  Goal: Remains free of injury related to seizures activity  2/20/2023 0349 by Catherine Miller RN  Outcome: Progressing  Flowsheets (Taken 2/20/2023 7908)  Remains free of injury related to seizure activity:   Maintain airway, patient safety  and administer oxygen as ordered   Monitor patient for seizure activity, document and report duration and description of seizure to Licensed Independent Practitioner   If seizure occurs, turn patient to side and suction secretions as needed   Reorient patient post seizure   Instruct patient/family to notify RN of any seizure activity  2/20/2023 0349 by Gilberto Montoya RN  Outcome: Progressing  Flowsheets (Taken 2/20/2023 5309)  Remains free of injury related to seizure activity:   Maintain airway, patient safety  and administer oxygen as ordered   Monitor patient for seizure activity, document and report duration and description of seizure to Licensed Independent Practitioner   If seizure occurs, turn patient to side and suction secretions as needed   Reorient patient post seizure   Instruct patient/family to notify RN of any seizure activity

## 2023-02-21 VITALS
RESPIRATION RATE: 19 BRPM | DIASTOLIC BLOOD PRESSURE: 80 MMHG | SYSTOLIC BLOOD PRESSURE: 118 MMHG | TEMPERATURE: 98.5 F | WEIGHT: 198.63 LBS | BODY MASS INDEX: 39 KG/M2 | HEIGHT: 60 IN | OXYGEN SATURATION: 95 % | HEART RATE: 69 BPM

## 2023-02-21 LAB
ABSOLUTE EOS #: 0.23 K/UL (ref 0–0.44)
ABSOLUTE IMMATURE GRANULOCYTE: <0.03 K/UL (ref 0–0.3)
ABSOLUTE LYMPH #: 2.46 K/UL (ref 1.1–3.7)
ABSOLUTE MONO #: 0.79 K/UL (ref 0.1–1.2)
ANION GAP SERPL CALCULATED.3IONS-SCNC: 10 MMOL/L (ref 9–17)
BASOPHILS # BLD: 1 % (ref 0–2)
BASOPHILS ABSOLUTE: 0.1 K/UL (ref 0–0.2)
BUN SERPL-MCNC: 19 MG/DL (ref 6–20)
CALCIUM SERPL-MCNC: 8.9 MG/DL (ref 8.6–10.4)
CHLORIDE SERPL-SCNC: 104 MMOL/L (ref 98–107)
CO2 SERPL-SCNC: 21 MMOL/L (ref 20–31)
CREAT SERPL-MCNC: 0.72 MG/DL (ref 0.5–0.9)
EOSINOPHILS RELATIVE PERCENT: 3 % (ref 1–4)
GFR SERPL CREATININE-BSD FRML MDRD: >60 ML/MIN/1.73M2
GLUCOSE SERPL-MCNC: 98 MG/DL (ref 70–99)
HCT VFR BLD AUTO: 46.9 % (ref 36.3–47.1)
HGB BLD-MCNC: 15.1 G/DL (ref 11.9–15.1)
IMMATURE GRANULOCYTES: 0 %
LYMPHOCYTES # BLD: 32 % (ref 24–43)
MCH RBC QN AUTO: 27.3 PG (ref 25.2–33.5)
MCHC RBC AUTO-ENTMCNC: 32.2 G/DL (ref 28.4–34.8)
MCV RBC AUTO: 84.8 FL (ref 82.6–102.9)
MONOCYTES # BLD: 10 % (ref 3–12)
NRBC AUTOMATED: 0 PER 100 WBC
PDW BLD-RTO: 12.8 % (ref 11.8–14.4)
PLATELET # BLD AUTO: 369 K/UL (ref 138–453)
PMV BLD AUTO: 9.8 FL (ref 8.1–13.5)
POTASSIUM SERPL-SCNC: 4.2 MMOL/L (ref 3.7–5.3)
RBC # BLD: 5.53 M/UL (ref 3.95–5.11)
SEG NEUTROPHILS: 54 % (ref 36–65)
SEGMENTED NEUTROPHILS ABSOLUTE COUNT: 4.03 K/UL (ref 1.5–8.1)
SODIUM SERPL-SCNC: 135 MMOL/L (ref 135–144)
WBC # BLD AUTO: 7.6 K/UL (ref 3.5–11.3)

## 2023-02-21 PROCEDURE — 6370000000 HC RX 637 (ALT 250 FOR IP): Performed by: STUDENT IN AN ORGANIZED HEALTH CARE EDUCATION/TRAINING PROGRAM

## 2023-02-21 PROCEDURE — 95720 EEG PHY/QHP EA INCR W/VEEG: CPT | Performed by: PSYCHIATRY & NEUROLOGY

## 2023-02-21 PROCEDURE — 2580000003 HC RX 258: Performed by: STUDENT IN AN ORGANIZED HEALTH CARE EDUCATION/TRAINING PROGRAM

## 2023-02-21 PROCEDURE — 99239 HOSP IP/OBS DSCHRG MGMT >30: CPT | Performed by: PSYCHIATRY & NEUROLOGY

## 2023-02-21 PROCEDURE — 80048 BASIC METABOLIC PNL TOTAL CA: CPT

## 2023-02-21 PROCEDURE — 36415 COLL VENOUS BLD VENIPUNCTURE: CPT

## 2023-02-21 PROCEDURE — 95714 VEEG EA 12-26 HR UNMNTR: CPT

## 2023-02-21 PROCEDURE — 85025 COMPLETE CBC W/AUTO DIFF WBC: CPT

## 2023-02-21 PROCEDURE — 6370000000 HC RX 637 (ALT 250 FOR IP)

## 2023-02-21 PROCEDURE — 6360000002 HC RX W HCPCS: Performed by: STUDENT IN AN ORGANIZED HEALTH CARE EDUCATION/TRAINING PROGRAM

## 2023-02-21 RX ORDER — TOPIRAMATE 25 MG/1
25 TABLET ORAL DAILY
Qty: 60 TABLET | Refills: 3 | Status: SHIPPED | OUTPATIENT
Start: 2023-02-22

## 2023-02-21 RX ORDER — LEVETIRACETAM 500 MG/1
500 TABLET ORAL 2 TIMES DAILY
Qty: 60 TABLET | Refills: 3 | Status: SHIPPED | OUTPATIENT
Start: 2023-02-21

## 2023-02-21 RX ORDER — LACOSAMIDE 200 MG/1
200 TABLET ORAL 2 TIMES DAILY
Qty: 60 TABLET | Refills: 2 | Status: SHIPPED | OUTPATIENT
Start: 2023-02-21 | End: 2023-03-23

## 2023-02-21 RX ADMIN — ONDANSETRON 4 MG: 4 TABLET, ORALLY DISINTEGRATING ORAL at 06:02

## 2023-02-21 RX ADMIN — TOPIRAMATE 25 MG: 25 TABLET, FILM COATED ORAL at 08:36

## 2023-02-21 RX ADMIN — SODIUM CHLORIDE, PRESERVATIVE FREE 10 ML: 5 INJECTION INTRAVENOUS at 08:36

## 2023-02-21 RX ADMIN — ENOXAPARIN SODIUM 40 MG: 100 INJECTION SUBCUTANEOUS at 08:36

## 2023-02-21 RX ADMIN — ACETAMINOPHEN 650 MG: 325 TABLET ORAL at 05:23

## 2023-02-21 RX ADMIN — LACOSAMIDE 200 MG: 100 TABLET, FILM COATED ORAL at 08:36

## 2023-02-21 RX ADMIN — LEVETIRACETAM 500 MG: 500 TABLET, FILM COATED ORAL at 08:36

## 2023-02-21 ASSESSMENT — ENCOUNTER SYMPTOMS
SORE THROAT: 0
CHOKING: 0
ABDOMINAL DISTENTION: 0
COUGH: 0
BACK PAIN: 0
RHINORRHEA: 0
VOMITING: 1
ABDOMINAL PAIN: 0
PHOTOPHOBIA: 0
SHORTNESS OF BREATH: 0
DIARRHEA: 0
WHEEZING: 0
NAUSEA: 1
COLOR CHANGE: 0
CONSTIPATION: 0

## 2023-02-21 ASSESSMENT — PAIN DESCRIPTION - LOCATION: LOCATION: HEAD

## 2023-02-21 ASSESSMENT — PAIN SCALES - GENERAL
PAINLEVEL_OUTOF10: 0
PAINLEVEL_OUTOF10: 0
PAINLEVEL_OUTOF10: 3
PAINLEVEL_OUTOF10: 0

## 2023-02-21 ASSESSMENT — PAIN - FUNCTIONAL ASSESSMENT: PAIN_FUNCTIONAL_ASSESSMENT: PREVENTS OR INTERFERES SOME ACTIVE ACTIVITIES AND ADLS

## 2023-02-21 ASSESSMENT — PAIN DESCRIPTION - DESCRIPTORS: DESCRIPTORS: ACHING

## 2023-02-21 NOTE — PLAN OF CARE
Problem: Discharge Planning  Goal: Discharge to home or other facility with appropriate resources  2/21/2023 1730 by Noe Freed RN  Outcome: Completed  Flowsheets (Taken 2/21/2023 1600)  Discharge to home or other facility with appropriate resources: Identify barriers to discharge with patient and caregiver  2/21/2023 1215 by Noe Freed RN  Outcome: Progressing  Flowsheets (Taken 2/21/2023 1200)  Discharge to home or other facility with appropriate resources: Identify barriers to discharge with patient and caregiver  2/21/2023 0553 by Anders Garcia RN  Outcome: Progressing     Problem: Safety - Adult  Goal: Free from fall injury  2/21/2023 1730 by Noe Freed RN  Outcome: Completed  2/21/2023 1215 by Noe Freed RN  Outcome: Progressing  Flowsheets (Taken 2/21/2023 0918)  Free From Fall Injury: Instruct family/caregiver on patient safety  2/21/2023 0553 by Anders Garcia RN  Outcome: Progressing  Flowsheets (Taken 2/20/2023 2000)  Free From Fall Injury: Instruct family/caregiver on patient safety     Problem: Pain  Goal: Verbalizes/displays adequate comfort level or baseline comfort level  2/21/2023 1730 by Noe Freed RN  Outcome: Completed  2/21/2023 1215 by Noe Freed RN  Outcome: Progressing  Flowsheets (Taken 2/21/2023 0946)  Verbalizes/displays adequate comfort level or baseline comfort level: Encourage patient to monitor pain and request assistance  2/21/2023 0553 by Anders Garcia RN  Outcome: Progressing  Flowsheets (Taken 2/20/2023 2335)  Verbalizes/displays adequate comfort level or baseline comfort level: Assess pain using appropriate pain scale     Problem: Neurosensory - Adult  Goal: Achieves stable or improved neurological status  2/21/2023 1730 by Noe Freed RN  Outcome: Completed  Flowsheets (Taken 2/21/2023 1600)  Achieves stable or improved neurological status: Assess for and report changes in neurological status  2/21/2023 1215 by Noe Freed RN  Outcome: Progressing  Flowsheets  Taken 2/21/2023 1200  Achieves stable or improved neurological status: Assess for and report changes in neurological status  Taken 2/21/2023 0740  Achieves stable or improved neurological status: Assess for and report changes in neurological status  2/21/2023 0553 by Aziza Perez RN  Outcome: Progressing  Goal: Absence of seizures  2/21/2023 1730 by Bridget Anne RN  Outcome: Completed  Flowsheets (Taken 2/21/2023 1600)  Absence of seizures: Monitor for seizure activity. If seizure occurs, document type and location of movements and any associated apnea  2/21/2023 1215 by Bridget Anne RN  Outcome: Progressing  Flowsheets  Taken 2/21/2023 1200  Absence of seizures: Monitor for seizure activity. If seizure occurs, document type and location of movements and any associated apnea  Taken 2/21/2023 0740  Absence of seizures: Monitor for seizure activity. If seizure occurs, document type and location of movements and any associated apnea  2/21/2023 0553 by Aziza Perez RN  Outcome: Progressing  Goal: Remains free of injury related to seizures activity  2/21/2023 1730 by Bridget Anne RN  Outcome: Completed  Flowsheets (Taken 2/21/2023 1600)  Remains free of injury related to seizure activity: Maintain airway, patient safety  and administer oxygen as ordered  2/21/2023 1215 by Bridget Anne RN  Outcome: Progressing  Flowsheets  Taken 2/21/2023 1200  Remains free of injury related to seizure activity: Maintain airway, patient safety  and administer oxygen as ordered  Taken 2/21/2023 0740  Remains free of injury related to seizure activity: Maintain airway, patient safety  and administer oxygen as ordered  2/21/2023 0553 by Aziza Perez RN  Outcome: Progressing     Problem: Neurosensory - Adult  Goal: Absence of seizures  2/21/2023 1730 by Bridget Anne RN  Outcome: Completed  Flowsheets (Taken 2/21/2023 1600)  Absence of seizures: Monitor for seizure activity.   If seizure occurs, document type and location of movements and any associated apnea  2/21/2023 1215 by Serina Jones RN  Outcome: Progressing  Flowsheets  Taken 2/21/2023 1200  Absence of seizures: Monitor for seizure activity. If seizure occurs, document type and location of movements and any associated apnea  Taken 2/21/2023 0740  Absence of seizures: Monitor for seizure activity.   If seizure occurs, document type and location of movements and any associated apnea  2/21/2023 0553 by John Campbell RN  Outcome: Progressing     Problem: Neurosensory - Adult  Goal: Remains free of injury related to seizures activity  2/21/2023 1730 by Serina Jones RN  Outcome: Completed  Flowsheets (Taken 2/21/2023 1600)  Remains free of injury related to seizure activity: Maintain airway, patient safety  and administer oxygen as ordered  2/21/2023 1215 by Serina Jones RN  Outcome: Progressing  Flowsheets  Taken 2/21/2023 1200  Remains free of injury related to seizure activity: Maintain airway, patient safety  and administer oxygen as ordered  Taken 2/21/2023 0740  Remains free of injury related to seizure activity: Maintain airway, patient safety  and administer oxygen as ordered  2/21/2023 0553 by John Campbell RN  Outcome: Progressing     Problem: ABCDS Injury Assessment  Goal: Absence of physical injury  2/21/2023 1730 by Serina Jones RN  Outcome: Completed  2/21/2023 1215 by Serina Jones RN  Outcome: Progressing  Flowsheets (Taken 2/21/2023 7846)  Absence of Physical Injury: Implement safety measures based on patient assessment

## 2023-02-21 NOTE — PROGRESS NOTES
[facility] Neurology   [address] [place] [address]    Progress Note             Date:   2/21/2023  Patient name:  Maritza Nj  Date of admission:  2/19/2023  1:51 AM  MRN:   [id number]  Account:  [de-identified]  YOB: 2000  PCP:    Rosalind Rodriguez MD  Room:   [address] [place] [address]  Code Status:    Full Code    Chief Complaint:     Seizure and headache    Interval hx: The patient was seen and examined at bedside. Is vitally stable, alert and oriented x 3. No acute events overnight. Improved headache  No seizure overnight  LTME was normal           Brief History of Present Illness: This is a 29-year-old female past medical history of migraine headaches, asthma, depression, anxiety, came as a transfer from [facility] for seizure-like activity. Reportedly had 3 seizures the night prior to admission, also had 3 more seizures while at [facility] AND [facility]. Seizures typically lasting 1 to 2 minutes described as shaking of all of her extremities, followed by a period of postictal lethargy. Denies any tongue biting, urinary or fecal incontinence. Was started on Keppra 500 mg twice daily, she was not having more seizures than was started on Vimpat 100 mg twice daily after loading dose. Had an EEG which was normal.  Transferred to [facility] for LTME monitoring. Recently has been having stressors. Admits that she has headache prior to seizure activity. Was also having headache and was started on Topamax 25 mg daily  Hospital course: patient was hooked to LTME for 2 days, no events were captured, headache continued to improve, psych consult likely as outpatient, continued on vimpat 200 mg bid, keppra 500 mg bid      Past Medical History:     Past Medical History:   Diagnosis Date    Asthma         Past Surgical History:     No past surgical history on file. Medications Prior to Admission:     Prior to Admission medications    Medication Sig Start Date End Date Taking?  Authorizing Provider   ibuprofen (ADVIL;MOTRIN) 200 MG tablet Take 200 mg by mouth every 6 hours as needed for Pain   Yes Historical Provider, MD        Allergies:     Aspirin    Social History:     Tobacco:    reports that she has been smoking. She has never used smokeless tobacco.  Alcohol:      reports current alcohol use. Drug Use:  reports no history of drug use. Family History:     Family History   Problem Relation Age of Onset    Diabetes Mother     No Known Problems Father     Seizures Brother         since birth       Review of Systems:     Review of Systems   Constitutional:  Negative for chills, fatigue and fever. HENT:  Negative for rhinorrhea and sore throat. Eyes:  Negative for photophobia and visual disturbance. Respiratory:  Negative for cough, choking, shortness of breath and wheezing. Cardiovascular:  Negative for chest pain, palpitations and leg swelling. Gastrointestinal:  Positive for nausea and vomiting. Negative for abdominal distention, abdominal pain, constipation and diarrhea. Endocrine: Negative for polyuria. Genitourinary:  Negative for difficulty urinating, dysuria, enuresis, frequency and hematuria. Musculoskeletal:  Negative for arthralgias and back pain. Skin:  Negative for color change and wound. Neurological:  Positive for seizures and headaches. Negative for tremors, weakness and light-headedness. Hematological:  Does not bruise/bleed easily. Psychiatric/Behavioral:  Negative for agitation, behavioral problems, confusion and hallucinations. The patient is not nervous/anxious.       Physical Exam:   BP (!) 106/59   Pulse 65   Temp 97.5 °F (36.4 °C) (Oral)   Resp 17   Ht 5' (1.524 m)   Wt 198 lb 10.2 oz (90.1 kg)   LMP 2023   SpO2 98%   BMI 38.79 kg/m²   Temp (24hrs), Av.1 °F (36.7 °C), Min:97.5 °F (36.4 °C), Max:98.5 °F (36.9 °C)    Recent Labs     23  1207   POCGLU 155*       No intake or output data in the 24 hours ending 23 8153        Neurologic Exam     GENERAL  Appears comfortable and in no distress   HEENT  NC/ AT   HEART  S1 and S2 heard; palpation of pulses: radial pulse    NECK  Supple and no bruits heard   MENTAL STATUS:  Alert, oriented, intact memory, no confusion, normal speech, normal language, no hallucination or delusion   CRANIAL NERVES: II     -      Visual fields intact to confrontation  III,IV,VI -  PERR, EOMs full, no ptosis  V     -     Normal facial sensation   VII    -     Normal facial symmetry  VIII   -     Intact hearing   IX,X -     Symmetrical palate  XI    -     Symmetrical shoulder shrug  XII   -     Midline tongue, no atrophy    MOTOR FUNCTION: RUE: Significant for good strength of grade 5/5 in proximal and distal muscle groups   LUE: Significant for good strength of grade 5/5 in proximal and distal muscle groups   RLE: Significant for good strength of grade 5/5 in proximal and distal muscle groups   LLE: Significant for good strength of grade 5/5 in proximal and distal muscle groups      Normal bulk, normal tone and no involuntary movements, no tremor   SENSORY FUNCTION:  Normal touch, normal pinprick, normal vibration, normal proprioception   CEREBELLAR FUNCTION:  Intact fine motor control over upper limbs and lower limbs   REFLEX FUNCTION:  Symmetric in upper and lower extremities, no Babinski sign   STATION and GAIT  Normal gait and tandem station, normal tip toes and heel walking       Investigations:      Laboratory Testing:  Recent Results (from the past 24 hour(s))   Basic Metabolic Panel w/ Reflex to MG    Collection Time: 02/21/23  5:09 AM   Result Value Ref Range    Glucose 98 70 - 99 mg/dL    BUN 19 6 - 20 mg/dL    Creatinine 0.72 0.50 - 0.90 mg/dL    Est, Glom Filt Rate >60 >60 mL/min/1.73m2    Calcium 8.9 8.6 - 10.4 mg/dL    Sodium 135 135 - 144 mmol/L    Potassium 4.2 3.7 - 5.3 mmol/L    Chloride 104 98 - 107 mmol/L    CO2 21 20 - 31 mmol/L    Anion Gap 10 9 - 17 mmol/L   CBC with Auto Differential    Collection Time: 02/21/23  5:09 AM   Result Value Ref Range    WBC 7.6 3.5 - 11.3 k/uL    RBC 5.53 (H) 3.95 - 5.11 m/uL    Hemoglobin 15.1 11.9 - 15.1 g/dL    Hematocrit 46.9 36.3 - 47.1 %    MCV 84.8 82.6 - 102.9 fL    MCH 27.3 25.2 - 33.5 pg    MCHC 32.2 28.4 - 34.8 g/dL    RDW 12.8 11.8 - 14.4 %    Platelets 894 978 - 659 k/uL    MPV 9.8 8.1 - 13.5 fL    NRBC Automated 0.0 0.0 per 100 WBC    Seg Neutrophils 54 36 - 65 %    Lymphocytes 32 24 - 43 %    Monocytes 10 3 - 12 %    Eosinophils % 3 1 - 4 %    Basophils 1 0 - 2 %    Immature Granulocytes 0 0 %    Segs Absolute 4.03 1.50 - 8.10 k/uL    Absolute Lymph # 2.46 1.10 - 3.70 k/uL    Absolute Mono # 0.79 0.10 - 1.20 k/uL    Absolute Eos # 0.23 0.00 - 0.44 k/uL    Basophils Absolute 0.10 0.00 - 0.20 k/uL    Absolute Immature Granulocyte <0.03 0.00 - 0.30 k/uL     Recent Labs     02/21/23  0509   WBC 7.6   RBC 5.53*   HGB 15.1   HCT 46.9   MCV 84.8   MCH 27.3   MCHC 32.2   RDW 12.8      MPV 9.8       Recent Labs     02/21/23  0509      K 4.2      CO2 21   BUN 19   CREATININE 0.72   GLUCOSE 98   CALCIUM 8.9       No results found for: LABA1C    Assessment :      Primary Problem  Seizures [facility])    Active Hospital Problems    Diagnosis Date Noted    Seizures ([facility] [R56.9] 02/19/2023     Priority: Medium    Depression [F32. A] 02/19/2023     Priority: Medium    Anxiety [F41.9] 02/19/2023     Priority: Medium    Seizure-like activity ([facility] [R56.9] 02/16/2023     Priority: Medium       Patient is a 20 y.o. female multiple seizure-like activity, transferred from [facility] for LTM E, came in here with Keppra 500 twice daily, Vimpat 200 mg twice daily, EEG at [facility] was normal.  CT head was unremarkable.   MRI brain was normal.  Impression: Possible seizure disorder, possible psychogenic nonepileptic seizure, stress-induced seizure, migraine headache    Plan:     1 day LTM E was normal, no events were captured  Continue LTME Psychiatry consult pending  Vimpat 200 mg  Continue with Keppra 500 mg twice daily  Continue with Topamax 25 mg daily for migraine prophylaxis  Recommend using OTC pain meds for abortive treatment. Psych eval out patient  Follow up with neurology 4-6 weeks to deescalate meds       No driving for at least 6 months. No heavy lifting for at least 6 months  No bathing or swimming unsupervised  Avoid locking doors, it prevents some to help you if needed  Avoid stairs unsupervised  Avoid cooking unsupervised        Follow-up further recommendations after discussing the case with attending  The plan was discussed with the patient, patient's family and the medical staff. Consultations:   IP CONSULT TO PSYCHIATRY  IP CONSULT TO IV TEAM  IP CONSULT TO PSYCHIATRY    Patient is admitted as inpatient status because of co-morbidities listed above, severity of signs and symptoms as outlined, requirement for current medical therapies and most importantly because of direct risk to patient if care not provided in a hospital setting.     Madyson Leach MD  Neurology Resident PGY-2  2/21/2023  9:59 AM    Copy sent to Dr. Chel Lindo MD

## 2023-02-21 NOTE — PROCEDURES
LONG-TERM EEG-VIDEO [address]    Patient: Chucho Yousif  Age: 20 y.o. MRN: [id number]    Referring Physician: Guy Hernandez MD  History: The patient is a 20 y.o. female who presented breakthrough seizure/encephalopathy. This long-term video-EEG monitoring study was performed to determine the nature of the patient's clinical events. The patient is on neuroactive medications.    Chucho Yousif   Current Facility-Administered Medications   Medication Dose Route Frequency Provider Last Rate Last Admin    topiramate (TOPAMAX) tablet 25 mg  25 mg Oral Daily Tom Pond MD   25 mg at 02/21/23 0836    sodium chloride flush 0.9 % injection 5-40 mL  5-40 mL IntraVENous 2 times per day David Glynn MD   10 mL at 02/21/23 0836    sodium chloride flush 0.9 % injection 5-40 mL  5-40 mL IntraVENous PRN David Glynn MD        0.9 % sodium chloride infusion   IntraVENous PRN David Glynn MD        enoxaparin (LOVENOX) injection 40 mg  40 mg SubCUTAneous Daily David Glynn MD   40 mg at 02/21/23 0836    ondansetron (ZOFRAN-ODT) disintegrating tablet 4 mg  4 mg Oral Q8H PRN David Glynn MD   4 mg at 02/21/23 0602    Or    ondansetron (ZOFRAN) injection 4 mg  4 mg IntraVENous Q6H PRN David Glynn MD        polyethylene glycol (GLYCOLAX) packet 17 g  17 g Oral Daily PRN David Glynn MD        acetaminophen (TYLENOL) tablet 650 mg  650 mg Oral Q6H PRN David Glynn MD   650 mg at 02/21/23 6277    Or    acetaminophen (TYLENOL) suppository 650 mg  650 mg Rectal Q6H PRN David Glynn MD        LORazepam (ATIVAN) injection 1 mg  1 mg IntraVENous Q5 Min PRN David Glynn MD        levETIRAcetam (KEPPRA) tablet 500 mg  500 mg Oral BID David Glynn MD   500 mg at 02/21/23 0836    lacosamide (VIMPAT) tablet 200 mg  200 mg Oral BID David Glynn MD   200 mg at 02/21/23 0836    magnesium sulfate 2000 mg in 50 mL IVPB premix  2,000 mg IntraVENous PRN Gibson Berry MD         Technical Description: This is a 21-channel digital EEG recording with time-locked video. Electrodes were placed in accordance with the 10-20 International System of Electrode Placement. Single lead EKG monitoring was included. Baseline EEG Recording:  A formal baseline EEG recording was not obtained. Day 1 - 2/20/23, starting at 730 am    Interictal EEG Samples: In the alert state, the posterior background rhythm was a symmetric, well-modulated, 9-10 Hz, 20-40 uV rhythm which reacted symmetrically to eye opening and had a normal frequency-amplitude gradient with an age-appropriate mixture of frequencies. During drowsiness, there were bursts of diffuse slowing and waxing and waning of the posterior dominant rhythm. During stage II sleep symmetric V waves, K complexes, and sleep spindles were seen. The appearance of diffuse delta activity was observed in slow wave sleep. Muscle atonia and frequent eye movement artifact was seen during periods of REM sleep. No abnormalities were activated by sleep. The EKG channel revealed no abnormalities. Ictal EEG Recording / Patient Events: During this period the patient had no events or seizures. Summary: During this day of recording no events were recorded. The interictal EEG was normal. Monitoring was continued in order to record the patient's typical events. The EKG channel revealed no abnormalities. Day 2 - 2/21/23, reviewed through 7:30 am    Interictal EEG Samples: Interictal EEG was unchanged from yesterday. Ictal EEG Recording / Patient Events: During this period the patient had no events or seizures. Summary: During this day of recording no events were recorded. The interictal EEG was normal. Monitoring was continued in order to record the patient's typical events. The EKG channel revealed no abnormalities.     Rochelle Thompson MD  Diplomate, American Board of Psychiatry and Neurology  Diplomate, American Board of Clinical Neurophysiology  Diplomate, American Board of Epilepsy     Please note this is a preliminary report and updated daily. The final report will have a summary of behavior and electrographic findings with clinical correlation.

## 2023-02-21 NOTE — PLAN OF CARE
Problem: Discharge Planning  Goal: Discharge to home or other facility with appropriate resources  Outcome: Progressing     Problem: Safety - Adult  Goal: Free from fall injury  Outcome: Progressing  Flowsheets (Taken 2/20/2023 2000)  Free From Fall Injury: Instruct family/caregiver on patient safety     Problem: Pain  Goal: Verbalizes/displays adequate comfort level or baseline comfort level  Outcome: Progressing  Flowsheets (Taken 2/20/2023 1035)  Verbalizes/displays adequate comfort level or baseline comfort level: Assess pain using appropriate pain scale     Problem: Neurosensory - Adult  Goal: Achieves stable or improved neurological status  Outcome: Progressing  Goal: Absence of seizures  Outcome: Progressing  Goal: Remains free of injury related to seizures activity  Outcome: Progressing

## 2023-02-21 NOTE — PLAN OF CARE
Problem: Discharge Planning  Goal: Discharge to home or other facility with appropriate resources  2/21/2023 1215 by Fadia Bhakta RN  Outcome: Progressing  Flowsheets (Taken 2/21/2023 1200)  Discharge to home or other facility with appropriate resources: Identify barriers to discharge with patient and caregiver  2/21/2023 0553 by Franci Driver RN  Outcome: Progressing     Problem: Safety - Adult  Goal: Free from fall injury  2/21/2023 1215 by Fadia Bhakta RN  Outcome: Progressing  [address] (Taken 2/21/2023 0918)  Free From Fall Injury: Instruct family/caregiver on patient safety  2/21/2023 0553 by Franci Driver RN  Outcome: Progressing  Flowsheets (Taken 2/20/2023 2000)  Free From Fall Injury: Instruct family/caregiver on patient safety     Problem: Pain  Goal: Verbalizes/displays adequate comfort level or baseline comfort level  2/21/2023 1215 by Fadia Bhakta RN  Outcome: Progressing  Flowsheets (Taken 2/21/2023 9436)  Verbalizes/displays adequate comfort level or baseline comfort level: Encourage patient to monitor pain and request assistance  2/21/2023 0553 by Franci Driver RN  Outcome: Progressing  Flowsheets (Taken 2/20/2023 2335)  Verbalizes/displays adequate comfort level or baseline comfort level: Assess pain using appropriate pain scale     Problem: Neurosensory - Adult  Goal: Achieves stable or improved neurological status  2/21/2023 1215 by Fadia Bhakta RN  Outcome: Progressing  Flowsheets  Taken 2/21/2023 1200  Achieves stable or improved neurological status: Assess for and report changes in neurological status  Taken 2/21/2023 0740  Achieves stable or improved neurological status: Assess for and report changes in neurological status  2/21/2023 0553 by Franci Driver RN  Outcome: Progressing  Goal: Absence of seizures  2/21/2023 1215 by Fadia Bhakta RN  Outcome: Progressing  Flowsheets  Taken 2/21/2023 1200  Absence of seizures: Monitor for seizure activity.   If seizure occurs, document type and location of movements and any associated apnea  Taken 2/21/2023 0740  Absence of seizures: Monitor for seizure activity.  If seizure occurs, document type and location of movements and any associated apnea  2/21/2023 0553 by Alison Francois RN  Outcome: Progressing  Goal: Remains free of injury related to seizures activity  2/21/2023 1215 by Vibha Calhoun RN  Outcome: Progressing  Flowsheets  Taken 2/21/2023 1200  Remains free of injury related to seizure activity: Maintain airway, patient safety  and administer oxygen as ordered  Taken 2/21/2023 0740  Remains free of injury related to seizure activity: Maintain airway, patient safety  and administer oxygen as ordered  2/21/2023 0553 by Alison Francois RN  Outcome: Progressing     Problem: Neurosensory - Adult  Goal: Absence of seizures  2/21/2023 1215 by Vibha Calhoun RN  Outcome: Progressing  Flowsheets  Taken 2/21/2023 1200  Absence of seizures: Monitor for seizure activity.  If seizure occurs, document type and location of movements and any associated apnea  Taken 2/21/2023 0740  Absence of seizures: Monitor for seizure activity.  If seizure occurs, document type and location of movements and any associated apnea  2/21/2023 0553 by Alison Francois RN  Outcome: Progressing     Problem: ABCDS Injury Assessment  Goal: Absence of physical injury  Outcome: Progressing  Flowsheets (Taken 2/21/2023 0918)  Absence of Physical Injury: Implement safety measures based on patient assessment

## 2023-02-22 NOTE — DISCHARGE SUMMARY
[facility]     Department of Neurology    INPATIENT DISCHARGE SUMMARY        Patient Identification:  Catarino Bowles is a 20 y.o. female. :  2000  MRN: [id number]     Acct: [de-identified]   Admit Date:  2023  Discharge date and time: 2023  6:20 PM   Attending Provider: No att. providers found                                     Admission Diagnoses:   Uncontrolled seizures ([address] [R56.9]  Seizure-like activity ([address] [R56.9]    Discharge Diagnoses:   Principal Problem:    Seizures ([address]  Active Problems:    Seizure-like activity ([address]    Depression    Anxiety  Resolved Problems:    * No resolved hospital problems. *       Consults:   none    Brief Inpatient course: This is a 43-year-old female past medical history of migraine headaches, asthma, depression, anxiety, came as a transfer from [facility] for seizure-like activity. Reportedly had 3 seizures the night prior to admission, also had 3 more seizures while at [facility]. Seizures typically lasting 1 to 2 minutes described as shaking of all of her extremities, followed by a period of postictal lethargy. Denies any tongue biting, urinary or fecal incontinence. Was started on Keppra 500 mg twice daily, she was not having more seizures than was started on Vimpat 100 mg twice daily after loading dose. Had an EEG which was normal.  Transferred to [facility] for [facility] monitoring. Recently has been having stressors. Admits that she has headache prior to seizure activity. Was also having headache and was started on Topamax 25 mg daily  Hospital course: patient was hooked to [facility] for 2 days, no events were captured, headache continued to improve, psych consult likely as outpatient, continued on vimpat 200 mg bid, keppra 500 mg bid, recommended to follow up with neurology to deescalate seizure medications, psych consult as out patient          Patient is stable from neurological standpoint to be discharged.     Procedures: LTME 2 days     Any Hospital Acquired Infections: none    Discharge Functional Status:  stable    Disposition: home    Patient Instructions:   No driving for at least 6 months. No heavy lifting for at least 6 months  No bathing or swimming unsupervised  Avoid locking doors, it prevents some to help you if needed  Avoid stairs unsupervised  Avoid cooking unsupervised        Discharge Medications:  Discharge Medication List as of 2/21/2023  5:47 PM        START taking these medications    Details   lacosamide (VIMPAT) 200 MG tablet Take 1 tablet by mouth 2 times daily for 30 days. Max Daily Amount: 400 mg, Disp-60 tablet, R-2Normal      levETIRAcetam (KEPPRA) 500 MG tablet Take 1 tablet by mouth 2 times daily, Disp-60 tablet, R-3Normal      topiramate (TOPAMAX) 25 MG tablet Take 1 tablet by mouth daily, Disp-60 tablet, R-3Normal           CONTINUE these medications which have NOT CHANGED    Details   ibuprofen (ADVIL;MOTRIN) 200 MG tablet Take 200 mg by mouth every 6 hours as needed for PainHistorical Med             Activity: activity as tolerated    Restrictions: No driving for at least 6 months. No heavy lifting for at least 6 months  No bathing or swimming unsupervised  Avoid locking doors, it prevents some to help you if needed  Avoid stairs unsupervised  Avoid cooking unsupervised      Diet: regular diet    Follow-up:    No follow-up provider specified.     Follow up labs: none    Follow up imaging: none    Note that over 30 minutes was spent in preparing discharge papers, discussing discharge with patient, medication review, etc.      Julianne Shannon MD,  Neurology Resident PGY-2  Department of Neurology  [address]  2/22/2023, 4:45 AM

## 2023-04-24 ENCOUNTER — OFFICE VISIT (OUTPATIENT)
Dept: NEUROLOGY | Age: 23
End: 2023-04-24
Payer: MEDICAID

## 2023-04-24 VITALS
HEART RATE: 80 BPM | BODY MASS INDEX: 38.87 KG/M2 | DIASTOLIC BLOOD PRESSURE: 78 MMHG | HEIGHT: 60 IN | SYSTOLIC BLOOD PRESSURE: 113 MMHG | OXYGEN SATURATION: 100 % | WEIGHT: 198 LBS

## 2023-04-24 DIAGNOSIS — R56.9 SEIZURES (HCC): Primary | ICD-10-CM

## 2023-04-24 DIAGNOSIS — G43.009 MIGRAINE WITHOUT AURA AND WITHOUT STATUS MIGRAINOSUS, NOT INTRACTABLE: ICD-10-CM

## 2023-04-24 PROCEDURE — 99214 OFFICE O/P EST MOD 30 MIN: CPT | Performed by: STUDENT IN AN ORGANIZED HEALTH CARE EDUCATION/TRAINING PROGRAM

## 2023-04-24 RX ORDER — LACOSAMIDE 100 MG/1
TABLET ORAL
Qty: 60 TABLET | Refills: 1 | Status: SHIPPED | OUTPATIENT
Start: 2023-04-24 | End: 2023-05-31

## 2023-04-24 RX ORDER — SUMATRIPTAN 50 MG/1
50 TABLET, FILM COATED ORAL
Qty: 9 TABLET | Refills: 3 | Status: SHIPPED | OUTPATIENT
Start: 2023-04-24 | End: 2023-04-24

## 2023-04-24 RX ORDER — LEVETIRACETAM 500 MG/1
1000 TABLET ORAL 2 TIMES DAILY
Qty: 60 TABLET | Refills: 3 | Status: SHIPPED | OUTPATIENT
Start: 2023-04-24

## 2023-04-24 RX ORDER — TOPIRAMATE 25 MG/1
50 TABLET ORAL NIGHTLY
Qty: 60 TABLET | Refills: 3 | Status: SHIPPED | OUTPATIENT
Start: 2023-04-24

## 2023-04-24 ASSESSMENT — ENCOUNTER SYMPTOMS: PHOTOPHOBIA: 0

## 2023-04-24 NOTE — PROGRESS NOTES
[address] [place] [address], [facility], [facility], [address], [address] [place] [address]  P: [phone]  F: [phone]    NEUROLOGY CLINIC NOTE     PATIENT NAME: Maritza Nj  PATIENT MRN: [id number]  PRIMARY CARE PHYSICIAN: Rosalind Rodriguez MD    HPI:      50-year-old female past medical history of migraine headaches, asthma, depression, anxiety, came as a transfer from [facility] for seizure-like activity. Reportedly had 3 seizures the night prior to admission, also had 3 more seizures while at [facility]. Seizures typically lasting 1 to 2 minutes described as shaking of all of her extremities, followed by a period of postictal lethargy. Denies any tongue biting, urinary or fecal incontinence. Was started on Keppra 500 mg twice daily, she was not having more seizures than was started on Vimpat 100 mg twice daily after loading dose. Had an EEG which was normal.  Transferred to [facility] for [facility] monitoring. Recently has been having stressors. Admits that she has headache prior to seizure activity. Was also having headache and was started on Topamax 25 mg daily  Hospital course: patient was hooked to LTME for 2 days, no events were captured, headache continued to improve, psych consult likely as outpatient, continued on vimpat 200 mg bid, keppra 500 mg bid.    4/24/2023: Patient was seen and evaluated in the in the clinic. She was awake alert and nonfocal exam.  Patient complain of having a seizure last Sunday where she became confused and had generalized shaking. Given previous unremarkable work-up with concern of semiology concerning for psychogenic seizures. Plan to wean off Vimpat in the next 5 weeks. Increase Keppra to 1 g twice daily. She continues to complain of headaches on the right side throbbing associated with mild nausea and photophobia 2-3 times a week.   Increase Topamax to 50 nightly and placed on sumatriptan 50 mg once followed by repeat in 24 hours on at the

## 2023-04-24 NOTE — PATIENT INSTRUCTIONS
No driving for at least 6 months.   No heavy lifting for at least 6 months  No bathing or swimming unsupervised  Avoid locking doors, it prevents some to help you if needed  Avoid stairs unsupervised  Avoid cooking unsupervised

## 2023-04-28 ENCOUNTER — HOSPITAL ENCOUNTER (EMERGENCY)
Age: 23
Discharge: HOME | End: 2023-04-28
Attending: EMERGENCY MEDICINE
Payer: MEDICAID

## 2023-04-28 VITALS
RESPIRATION RATE: 16 BRPM | TEMPERATURE: 98.3 F | OXYGEN SATURATION: 100 % | SYSTOLIC BLOOD PRESSURE: 111 MMHG | DIASTOLIC BLOOD PRESSURE: 81 MMHG | HEART RATE: 76 BPM

## 2023-04-28 DIAGNOSIS — F44.5 PSYCHIATRIC PSEUDOSEIZURE: Primary | ICD-10-CM

## 2023-04-28 LAB
ABSOLUTE EOS #: 0.16 K/UL (ref 0–0.44)
ABSOLUTE IMMATURE GRANULOCYTE: 0.02 K/UL (ref 0–0.3)
ABSOLUTE LYMPH #: 1.66 K/UL (ref 1.1–3.7)
ABSOLUTE MONO #: 0.56 K/UL (ref 0.1–1.2)
ANION GAP SERPL CALCULATED.3IONS-SCNC: 7 MMOL/L (ref 9–17)
BASOPHILS # BLD: 2 % (ref 0–2)
BASOPHILS ABSOLUTE: 0.1 K/UL (ref 0–0.2)
BUN SERPL-MCNC: 15 MG/DL (ref 6–20)
BUN/CREAT BLD: 27 (ref 9–20)
CALCIUM SERPL-MCNC: 8.9 MG/DL (ref 8.6–10.4)
CHLORIDE SERPL-SCNC: 105 MMOL/L (ref 98–107)
CO2 SERPL-SCNC: 25 MMOL/L (ref 20–31)
CREAT SERPL-MCNC: 0.56 MG/DL (ref 0.5–0.9)
EOSINOPHILS RELATIVE PERCENT: 2 % (ref 1–4)
GFR SERPL CREATININE-BSD FRML MDRD: >60 ML/MIN/1.73M2
GLUCOSE SERPL-MCNC: 90 MG/DL (ref 70–99)
HCT VFR BLD AUTO: 41.3 % (ref 36.3–47.1)
HGB BLD-MCNC: 13.8 G/DL (ref 11.9–15.1)
IMMATURE GRANULOCYTES: 0 %
KEPPRA: <2 UG/ML
LYMPHOCYTES # BLD: 25 % (ref 24–43)
MCH RBC QN AUTO: 27.8 PG (ref 25.2–33.5)
MCHC RBC AUTO-ENTMCNC: 33.4 G/DL (ref 28.4–34.8)
MCV RBC AUTO: 83.1 FL (ref 82.6–102.9)
MONOCYTES # BLD: 8 % (ref 3–12)
NRBC AUTOMATED: 0 PER 100 WBC
PDW BLD-RTO: 13.2 % (ref 11.8–14.4)
PLATELET # BLD AUTO: 372 K/UL (ref 138–453)
PMV BLD AUTO: 9.4 FL (ref 8.1–13.5)
POTASSIUM SERPL-SCNC: 4.2 MMOL/L (ref 3.7–5.3)
RBC # BLD: 4.97 M/UL (ref 3.95–5.11)
SEG NEUTROPHILS: 63 % (ref 36–65)
SEGMENTED NEUTROPHILS ABSOLUTE COUNT: 4.27 K/UL (ref 1.5–8.1)
SODIUM SERPL-SCNC: 137 MMOL/L (ref 135–144)
WBC # BLD AUTO: 6.8 K/UL (ref 3.5–11.3)

## 2023-04-28 PROCEDURE — 36415 COLL VENOUS BLD VENIPUNCTURE: CPT

## 2023-04-28 PROCEDURE — 80048 BASIC METABOLIC PNL TOTAL CA: CPT

## 2023-04-28 PROCEDURE — 99284 EMERGENCY DEPT VISIT MOD MDM: CPT

## 2023-04-28 PROCEDURE — 2580000003 HC RX 258: Performed by: EMERGENCY MEDICINE

## 2023-04-28 PROCEDURE — 6360000002 HC RX W HCPCS: Performed by: EMERGENCY MEDICINE

## 2023-04-28 PROCEDURE — 96366 THER/PROPH/DIAG IV INF ADDON: CPT

## 2023-04-28 PROCEDURE — 93005 ELECTROCARDIOGRAM TRACING: CPT | Performed by: EMERGENCY MEDICINE

## 2023-04-28 PROCEDURE — 80201 ASSAY OF TOPIRAMATE: CPT

## 2023-04-28 PROCEDURE — 96375 TX/PRO/DX INJ NEW DRUG ADDON: CPT

## 2023-04-28 PROCEDURE — 96365 THER/PROPH/DIAG IV INF INIT: CPT

## 2023-04-28 PROCEDURE — 85025 COMPLETE CBC W/AUTO DIFF WBC: CPT

## 2023-04-28 PROCEDURE — 6370000000 HC RX 637 (ALT 250 FOR IP): Performed by: EMERGENCY MEDICINE

## 2023-04-28 PROCEDURE — 80177 DRUG SCRN QUAN LEVETIRACETAM: CPT

## 2023-04-28 RX ORDER — LEVETIRACETAM 10 MG/ML
1000 INJECTION INTRAVASCULAR ONCE
Status: COMPLETED | OUTPATIENT
Start: 2023-04-28 | End: 2023-04-28

## 2023-04-28 RX ORDER — TOPIRAMATE 25 MG/1
25 TABLET ORAL ONCE
Status: COMPLETED | OUTPATIENT
Start: 2023-04-28 | End: 2023-04-28

## 2023-04-28 RX ORDER — 0.9 % SODIUM CHLORIDE 0.9 %
1000 INTRAVENOUS SOLUTION INTRAVENOUS ONCE
Status: COMPLETED | OUTPATIENT
Start: 2023-04-28 | End: 2023-04-28

## 2023-04-28 RX ORDER — KETOROLAC TROMETHAMINE 15 MG/ML
15 INJECTION, SOLUTION INTRAMUSCULAR; INTRAVENOUS ONCE
Status: COMPLETED | OUTPATIENT
Start: 2023-04-28 | End: 2023-04-28

## 2023-04-28 RX ADMIN — TOPIRAMATE 25 MG: 25 TABLET, FILM COATED ORAL at 14:55

## 2023-04-28 RX ADMIN — KETOROLAC TROMETHAMINE 15 MG: 15 INJECTION, SOLUTION INTRAMUSCULAR; INTRAVENOUS at 13:51

## 2023-04-28 RX ADMIN — SODIUM CHLORIDE 1000 ML: 9 INJECTION, SOLUTION INTRAVENOUS at 13:48

## 2023-04-28 RX ADMIN — LEVETIRACETAM 1000 MG: 1000 INJECTION, SOLUTION INTRAVENOUS at 14:59

## 2023-04-28 ASSESSMENT — PAIN SCALES - GENERAL
PAINLEVEL_OUTOF10: 4
PAINLEVEL_OUTOF10: 6

## 2023-04-28 ASSESSMENT — PAIN DESCRIPTION - DESCRIPTORS: DESCRIPTORS: STABBING;HEAVINESS

## 2023-04-28 ASSESSMENT — PAIN - FUNCTIONAL ASSESSMENT: PAIN_FUNCTIONAL_ASSESSMENT: 0-10

## 2023-04-28 ASSESSMENT — PAIN DESCRIPTION - LOCATION: LOCATION: HEAD;CHEST

## 2023-04-28 ASSESSMENT — PAIN DESCRIPTION - FREQUENCY: FREQUENCY: CONTINUOUS

## 2023-04-28 NOTE — ED NOTES
LSW met with patient at bedside. Patient reports that she has been unable to get her medications. Patient indicated that she utilizes [employer]. LSW contacted pharmacy and asked meds to be filled. LSW also provided patient with the number to get a prior auth from neuro Dr and several referrals for dental care.

## 2023-04-28 NOTE — ED PROVIDER NOTES
EMERGENCY DEPARTMENT ENCOUNTER    Pt Name: Kelli Eli  MRN: [id number]  [place] 2000  Date of evaluation: 4/28/23  CHIEF COMPLAINT       Chief Complaint   Patient presents with    Seizures     Diagnosed 2/15 for seizures, had one seizure at work today, ran out of meds last night, insurance won't except  meds    Chest Pain     Onset after having a seizure today     HISTORY OF PRESENT ILLNESS   The history is provided by the patient and medical records. Patient is a 23-year-old female brought in by EMS for seizures. Patient states she was at work and felt unwell with lightheadedness. Her mother helped her to a chair (they work together) and her manager called EMS. Patient states she did not lose consciousness. She takes Keppra and Topamax for seizures however ran out of her medications yesterday. According to patient, her doctor reduced the dosage and the insurance company will not fill the prescription for some reason. She reports mild headache the ED otherwise feeling well. No fever, cough, shortness breath, chest pain, abdominal pain. She reports that she had similar episode about 2 and half months ago, was taken to [place] and was advised was likely anxiety driven. REVIEW OF SYSTEMS     Review of Systems   All other systems reviewed and are negative. 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 A    Anxiety F41.9     SURGICAL HISTORY     History reviewed. No pertinent surgical history.   CURRENT MEDICATIONS       Previous Medications

## 2023-04-30 LAB
EKG ATRIAL RATE: 66 BPM
EKG P AXIS: 24 DEGREES
EKG P-R INTERVAL: 108 MS
EKG Q-T INTERVAL: 420 MS
EKG QRS DURATION: 128 MS
EKG QTC CALCULATION (BAZETT): 440 MS
EKG R AXIS: -32 DEGREES
EKG T AXIS: 44 DEGREES
EKG VENTRICULAR RATE: 66 BPM
TOPIRAMATE LEVEL: <1.5 UG/ML (ref 5–20)

## 2023-04-30 PROCEDURE — 93010 ELECTROCARDIOGRAM REPORT: CPT | Performed by: INTERNAL MEDICINE

## 2023-05-10 ENCOUNTER — HOSPITAL ENCOUNTER (EMERGENCY)
Age: 23
Discharge: HOME | End: 2023-05-10
Attending: EMERGENCY MEDICINE
Payer: MEDICAID

## 2023-05-10 VITALS
WEIGHT: 198 LBS | SYSTOLIC BLOOD PRESSURE: 113 MMHG | DIASTOLIC BLOOD PRESSURE: 74 MMHG | BODY MASS INDEX: 38.87 KG/M2 | TEMPERATURE: 98.2 F | HEART RATE: 71 BPM | RESPIRATION RATE: 14 BRPM | HEIGHT: 60 IN | OXYGEN SATURATION: 97 %

## 2023-05-10 DIAGNOSIS — F44.5 PSYCHIATRIC PSEUDOSEIZURE: Primary | ICD-10-CM

## 2023-05-10 PROCEDURE — 6370000000 HC RX 637 (ALT 250 FOR IP): Performed by: PHYSICIAN ASSISTANT

## 2023-05-10 PROCEDURE — 99283 EMERGENCY DEPT VISIT LOW MDM: CPT

## 2023-05-10 RX ORDER — ACETAMINOPHEN 500 MG
1000 TABLET ORAL ONCE
Status: COMPLETED | OUTPATIENT
Start: 2023-05-10 | End: 2023-05-10

## 2023-05-10 RX ORDER — LEVETIRACETAM 500 MG/1
1000 TABLET, EXTENDED RELEASE ORAL DAILY
Status: DISCONTINUED | OUTPATIENT
Start: 2023-05-10 | End: 2023-05-10 | Stop reason: HOSPADM

## 2023-05-10 RX ADMIN — LEVETIRACETAM 1000 MG: 500 TABLET, FILM COATED, EXTENDED RELEASE ORAL at 12:57

## 2023-05-10 RX ADMIN — ACETAMINOPHEN 1000 MG: 500 TABLET ORAL at 12:56

## 2023-05-10 ASSESSMENT — LIFESTYLE VARIABLES
HOW OFTEN DO YOU HAVE A DRINK CONTAINING ALCOHOL: NEVER
HOW MANY STANDARD DRINKS CONTAINING ALCOHOL DO YOU HAVE ON A TYPICAL DAY: PATIENT DOES NOT DRINK

## 2023-05-10 ASSESSMENT — PAIN DESCRIPTION - DESCRIPTORS: DESCRIPTORS: ACHING

## 2023-05-10 ASSESSMENT — ENCOUNTER SYMPTOMS
ABDOMINAL PAIN: 0
BACK PAIN: 0
DIARRHEA: 0
CONSTIPATION: 0
NAUSEA: 0
CHEST TIGHTNESS: 0
SHORTNESS OF BREATH: 0
VOMITING: 0
WHEEZING: 0
BLOOD IN STOOL: 0
COUGH: 0

## 2023-05-10 ASSESSMENT — PAIN DESCRIPTION - LOCATION: LOCATION: HEAD

## 2023-05-10 NOTE — ED PROVIDER NOTES
eMERGENCY dEPARTMENT eNCOUnter   Independent Attestation     Pt Name: Jennifer Michel  MRN: [id number]  [place] 2000  Date of evaluation: 5/10/23     Jennifer Stage is a 20 y.o. female with CC: Seizures        This visit was performed by both a physician and an APC. I performed all aspects of the MDM as documented.       The care is provided during an unprecedented national emergency due to the novel coronavirus, Jesenia Rios MD  Attending Emergency Physician            Mehnaz Werner MD  05/10/23 8558
below. Labs Reviewed - No data to display    Vitals Reviewed:    Vitals:    05/10/23 1205 05/10/23 1226 05/10/23 1258   BP: 113/74     Pulse: 75 70 71   Resp: 16 18 14   Temp: 98.2 °F (36.8 °C)     TempSrc: Oral     SpO2: 98% 97% 97%   Weight: 198 lb (89.8 kg)     Height: 5' (1.524 m)       MEDICATIONS GIVEN TO PATIENT THIS ENCOUNTER:  Orders Placed This Encounter   Medications    acetaminophen (TYLENOL) tablet 1,000 mg    levETIRAcetam (KEPPRA XR) extended release tablet     DISCHARGE PRESCRIPTIONS:  Discharge Medication List as of 5/10/2023  1:19 PM        PHYSICIAN CONSULTS ORDERED THIS ENCOUNTER:  None  FINAL IMPRESSION      1.  Psychiatric pseudoseizure          DISPOSITION/PLAN   DISPOSITION Decision To Discharge 05/10/2023 12:57:27 PM      OUTPATIENT FOLLOW UP THE PATIENT:  Beth Lopez MD  [facility] [id number]  [address] [facility], [place]        [place], [place]  05/10/23 1339

## 2023-05-10 NOTE — CARE COORDINATION
LSW contacted the [employer] on [facility] and [address] [place] [address]. They were able to fill patient meds except for the Vimpat. LSW contacted Dr Ashley Malcolm office and requested a prior auth. Educated patient on ordering refills and calling Dr office to navigate needing a prior [place]. Patient to get meds today and Vimpat after prior auth complete.

## 2023-06-21 ENCOUNTER — HOSPITAL ENCOUNTER (EMERGENCY)
Age: 23
Discharge: HOME | End: 2023-06-21
Attending: EMERGENCY MEDICINE
Payer: MEDICAID

## 2023-06-21 VITALS
HEART RATE: 81 BPM | WEIGHT: 212 LBS | BODY MASS INDEX: 41.62 KG/M2 | SYSTOLIC BLOOD PRESSURE: 137 MMHG | RESPIRATION RATE: 16 BRPM | DIASTOLIC BLOOD PRESSURE: 87 MMHG | TEMPERATURE: 98.3 F | HEIGHT: 60 IN | OXYGEN SATURATION: 99 %

## 2023-06-21 DIAGNOSIS — G43.009 MIGRAINE WITHOUT AURA AND WITHOUT STATUS MIGRAINOSUS, NOT INTRACTABLE: Primary | ICD-10-CM

## 2023-06-21 PROCEDURE — 6360000002 HC RX W HCPCS: Performed by: EMERGENCY MEDICINE

## 2023-06-21 PROCEDURE — 80177 DRUG SCRN QUAN LEVETIRACETAM: CPT

## 2023-06-21 PROCEDURE — 80201 ASSAY OF TOPIRAMATE: CPT

## 2023-06-21 PROCEDURE — 99284 EMERGENCY DEPT VISIT MOD MDM: CPT

## 2023-06-21 PROCEDURE — 96375 TX/PRO/DX INJ NEW DRUG ADDON: CPT

## 2023-06-21 PROCEDURE — 96374 THER/PROPH/DIAG INJ IV PUSH: CPT

## 2023-06-21 PROCEDURE — 36415 COLL VENOUS BLD VENIPUNCTURE: CPT

## 2023-06-21 PROCEDURE — 2580000003 HC RX 258: Performed by: EMERGENCY MEDICINE

## 2023-06-21 RX ORDER — METOCLOPRAMIDE HYDROCHLORIDE 5 MG/ML
10 INJECTION INTRAMUSCULAR; INTRAVENOUS ONCE
Status: COMPLETED | OUTPATIENT
Start: 2023-06-21 | End: 2023-06-21

## 2023-06-21 RX ORDER — DEXAMETHASONE SODIUM PHOSPHATE 4 MG/ML
4 INJECTION, SOLUTION INTRA-ARTICULAR; INTRALESIONAL; INTRAMUSCULAR; INTRAVENOUS; SOFT TISSUE ONCE
Status: COMPLETED | OUTPATIENT
Start: 2023-06-21 | End: 2023-06-21

## 2023-06-21 RX ORDER — KETOROLAC TROMETHAMINE 15 MG/ML
15 INJECTION, SOLUTION INTRAMUSCULAR; INTRAVENOUS ONCE
Status: COMPLETED | OUTPATIENT
Start: 2023-06-21 | End: 2023-06-21

## 2023-06-21 RX ORDER — DIPHENHYDRAMINE HYDROCHLORIDE 50 MG/ML
25 INJECTION INTRAMUSCULAR; INTRAVENOUS ONCE
Status: COMPLETED | OUTPATIENT
Start: 2023-06-21 | End: 2023-06-21

## 2023-06-21 RX ORDER — 0.9 % SODIUM CHLORIDE 0.9 %
1000 INTRAVENOUS SOLUTION INTRAVENOUS ONCE
Status: COMPLETED | OUTPATIENT
Start: 2023-06-21 | End: 2023-06-21

## 2023-06-21 RX ADMIN — DIPHENHYDRAMINE HYDROCHLORIDE 25 MG: 50 INJECTION, SOLUTION INTRAMUSCULAR; INTRAVENOUS at 19:15

## 2023-06-21 RX ADMIN — SODIUM CHLORIDE 1000 ML: 9 INJECTION, SOLUTION INTRAVENOUS at 19:13

## 2023-06-21 RX ADMIN — DEXAMETHASONE SODIUM PHOSPHATE 4 MG: 4 INJECTION, SOLUTION INTRAMUSCULAR; INTRAVENOUS at 19:14

## 2023-06-21 RX ADMIN — KETOROLAC TROMETHAMINE 15 MG: 15 INJECTION, SOLUTION INTRAMUSCULAR; INTRAVENOUS at 19:14

## 2023-06-21 RX ADMIN — METOCLOPRAMIDE HYDROCHLORIDE 10 MG: 5 INJECTION INTRAMUSCULAR; INTRAVENOUS at 19:15

## 2023-06-21 ASSESSMENT — PAIN SCALES - GENERAL: PAINLEVEL_OUTOF10: 7

## 2023-06-21 NOTE — ED PROVIDER NOTES
EMERGENCY DEPARTMENT ENCOUNTER    Pt Name: Liya Jacinto  MRN: [id number]  [place] 2000  Date of evaluation: 6/21/23  CHIEF COMPLAINT       Chief Complaint   Patient presents with    Migraine     Started around 11 am this morning while at work. Has hx of Migraines and states that they cause her to have seizures. Takes migraine and seizure medications      HISTORY OF PRESENT ILLNESS   The history is provided by the patient and medical records. Patient is a 50-year-old female brought in by mom for migraine headache. Symptoms started 11 AM this morning at work. She took her medications as prescribed however symptoms did not improve. Headache identical location, quality and severity as previous migraine headaches. She has history of migraines and pseudoseizures. She has been taking her Keppra and Topamax as prescribed. No other issues at this time. REVIEW OF SYSTEMS     Review of Systems   All other systems reviewed and are negative. 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 A    Anxiety F41.9     SURGICAL HISTORY     No past surgical history on file.   CURRENT MEDICATIONS       Previous Medications    IBUPROFEN (ADVIL;MOTRIN) 200 MG TABLET    Take 1 tablet by mouth every 6 hours as needed for Pain    LACOSAMIDE (VIMPAT) 100 MG TABS TABLET    Take vimpat   100 in the am then 200 at night for 1 week then next week  100 in the am then 100 at night for 1 week then next week  100 in the an then 50 at night then next week  50 in the morning and 50 at night then next

## 2023-06-22 LAB — KEPPRA: 11 UG/ML

## 2023-06-23 LAB — TOPIRAMATE LEVEL: <1.5 UG/ML (ref 5–20)

## 2023-06-26 ENCOUNTER — TELEPHONE (OUTPATIENT)
Dept: NEUROLOGY | Age: 23
End: 2023-06-26

## 2023-07-08 ENCOUNTER — HOSPITAL ENCOUNTER (EMERGENCY)
Age: 23
Discharge: HOME | End: 2023-07-09
Attending: EMERGENCY MEDICINE
Payer: MEDICAID

## 2023-07-08 ENCOUNTER — APPOINTMENT (OUTPATIENT)
Dept: ULTRASOUND IMAGING | Age: 23
End: 2023-07-08
Payer: MEDICAID

## 2023-07-08 ENCOUNTER — APPOINTMENT (OUTPATIENT)
Dept: GENERAL RADIOLOGY | Age: 23
End: 2023-07-08
Payer: MEDICAID

## 2023-07-08 VITALS
SYSTOLIC BLOOD PRESSURE: 110 MMHG | HEIGHT: 60 IN | TEMPERATURE: 98.3 F | HEART RATE: 66 BPM | BODY MASS INDEX: 41.62 KG/M2 | WEIGHT: 212 LBS | OXYGEN SATURATION: 99 % | DIASTOLIC BLOOD PRESSURE: 69 MMHG | RESPIRATION RATE: 22 BRPM

## 2023-07-08 DIAGNOSIS — Z34.91 FIRST TRIMESTER PREGNANCY: ICD-10-CM

## 2023-07-08 DIAGNOSIS — R07.89 ATYPICAL CHEST PAIN: Primary | ICD-10-CM

## 2023-07-08 LAB
ALBUMIN SERPL-MCNC: 4.2 G/DL (ref 3.5–5.2)
ALP SERPL-CCNC: 85 U/L (ref 35–104)
ALT SERPL-CCNC: 13 U/L (ref 5–33)
ANION GAP SERPL CALCULATED.3IONS-SCNC: 15 MMOL/L (ref 9–17)
AST SERPL-CCNC: 13 U/L
B-HCG SERPL EIA 3RD IS-ACNC: ABNORMAL MIU/ML
BACTERIA URNS QL MICRO: ABNORMAL
BASOPHILS # BLD: 0.08 K/UL (ref 0–0.2)
BASOPHILS NFR BLD: 1 % (ref 0–2)
BILIRUB SERPL-MCNC: 0.2 MG/DL (ref 0.3–1.2)
BILIRUB UR QL STRIP: NEGATIVE
BUN SERPL-MCNC: 13 MG/DL (ref 6–20)
BUN/CREAT SERPL: 21 (ref 9–20)
CALCIUM SERPL-MCNC: 9.5 MG/DL (ref 8.6–10.4)
CHLORIDE SERPL-SCNC: 104 MMOL/L (ref 98–107)
CLARITY UR: ABNORMAL
CO2 SERPL-SCNC: 21 MMOL/L (ref 20–31)
COLOR UR: YELLOW
CREAT SERPL-MCNC: 0.62 MG/DL (ref 0.5–0.9)
CRYSTALS URNS MICRO: ABNORMAL /HPF
D DIMER PPP FEU-MCNC: 0.53 UG/ML FEU (ref 0–0.59)
EOSINOPHIL # BLD: 0.26 K/UL (ref 0–0.44)
EOSINOPHILS RELATIVE PERCENT: 3 % (ref 1–4)
EPI CELLS #/AREA URNS HPF: ABNORMAL /HPF (ref 0–5)
ERYTHROCYTE [DISTWIDTH] IN BLOOD BY AUTOMATED COUNT: 13 % (ref 11.8–14.4)
GFR SERPL CREATININE-BSD FRML MDRD: >60 ML/MIN/1.73M2
GLUCOSE SERPL-MCNC: 68 MG/DL (ref 70–99)
GLUCOSE UR STRIP-MCNC: NEGATIVE MG/DL
HCG UR QL: POSITIVE
HCT VFR BLD AUTO: 44 % (ref 36.3–47.1)
HGB BLD-MCNC: 14.4 G/DL (ref 11.9–15.1)
HGB UR QL STRIP.AUTO: NEGATIVE
IMM GRANULOCYTES # BLD AUTO: 0.03 K/UL (ref 0–0.3)
IMM GRANULOCYTES NFR BLD: 0 %
KETONES UR STRIP-MCNC: NEGATIVE MG/DL
LACTATE BLDV-SCNC: 2 MMOL/L (ref 0.5–2.2)
LEUKOCYTE ESTERASE UR QL STRIP: NEGATIVE
LYMPHOCYTES # BLD: 25 % (ref 24–43)
LYMPHOCYTES NFR BLD: 2.3 K/UL (ref 1.1–3.7)
MCH RBC QN AUTO: 27.7 PG (ref 25.2–33.5)
MCHC RBC AUTO-ENTMCNC: 32.7 G/DL (ref 28.4–34.8)
MCV RBC AUTO: 84.8 FL (ref 82.6–102.9)
MONOCYTES NFR BLD: 0.96 K/UL (ref 0.1–1.2)
MONOCYTES NFR BLD: 11 % (ref 3–12)
MUCOUS THREADS URNS QL MICRO: ABNORMAL
NEUTROPHILS NFR BLD: 60 % (ref 36–65)
NEUTS SEG NFR BLD: 5.42 K/UL (ref 1.5–8.1)
NITRITE UR QL STRIP: NEGATIVE
NRBC BLD-RTO: 0 PER 100 WBC
PH UR STRIP: 5.5 [PH] (ref 5–8)
PLATELET # BLD AUTO: 335 K/UL (ref 138–453)
PMV BLD AUTO: 9.6 FL (ref 8.1–13.5)
POTASSIUM SERPL-SCNC: 3.6 MMOL/L (ref 3.7–5.3)
PROT SERPL-MCNC: 7.5 G/DL (ref 6.4–8.3)
PROT UR STRIP-MCNC: NEGATIVE MG/DL
RBC # BLD AUTO: 5.19 M/UL (ref 3.95–5.11)
RBC #/AREA URNS HPF: ABNORMAL /HPF (ref 0–2)
SODIUM SERPL-SCNC: 140 MMOL/L (ref 135–144)
SP GR UR STRIP: 1.05 (ref 1–1.03)
TROPONIN I SERPL HS-MCNC: <6 NG/L (ref 0–14)
UROBILINOGEN UR STRIP-ACNC: NORMAL
WBC #/AREA URNS HPF: ABNORMAL /HPF (ref 0–5)
WBC OTHER # BLD: 9.1 K/UL (ref 3.5–11.3)

## 2023-07-08 PROCEDURE — 76817 TRANSVAGINAL US OBSTETRIC: CPT

## 2023-07-08 PROCEDURE — 84484 ASSAY OF TROPONIN QUANT: CPT

## 2023-07-08 PROCEDURE — 93005 ELECTROCARDIOGRAM TRACING: CPT | Performed by: EMERGENCY MEDICINE

## 2023-07-08 PROCEDURE — 84702 CHORIONIC GONADOTROPIN TEST: CPT

## 2023-07-08 PROCEDURE — 85379 FIBRIN DEGRADATION QUANT: CPT

## 2023-07-08 PROCEDURE — 99284 EMERGENCY DEPT VISIT MOD MDM: CPT

## 2023-07-08 PROCEDURE — 80053 COMPREHEN METABOLIC PANEL: CPT

## 2023-07-08 PROCEDURE — 83605 ASSAY OF LACTIC ACID: CPT

## 2023-07-08 PROCEDURE — 81001 URINALYSIS AUTO W/SCOPE: CPT

## 2023-07-08 PROCEDURE — 76801 OB US < 14 WKS SINGLE FETUS: CPT

## 2023-07-08 PROCEDURE — 81025 URINE PREGNANCY TEST: CPT

## 2023-07-08 PROCEDURE — 85027 COMPLETE CBC AUTOMATED: CPT

## 2023-07-08 PROCEDURE — 2580000003 HC RX 258: Performed by: EMERGENCY MEDICINE

## 2023-07-08 PROCEDURE — 71045 X-RAY EXAM CHEST 1 VIEW: CPT

## 2023-07-08 RX ORDER — 0.9 % SODIUM CHLORIDE 0.9 %
1000 INTRAVENOUS SOLUTION INTRAVENOUS ONCE
Status: COMPLETED | OUTPATIENT
Start: 2023-07-08 | End: 2023-07-08

## 2023-07-08 RX ADMIN — SODIUM CHLORIDE 1000 ML: 9 INJECTION, SOLUTION INTRAVENOUS at 22:21

## 2023-07-08 ASSESSMENT — PAIN SCALES - GENERAL: PAINLEVEL_OUTOF10: 3

## 2023-07-08 ASSESSMENT — PAIN - FUNCTIONAL ASSESSMENT: PAIN_FUNCTIONAL_ASSESSMENT: 0-10

## 2023-07-09 ASSESSMENT — ENCOUNTER SYMPTOMS
NAUSEA: 1
SHORTNESS OF BREATH: 1
ABDOMINAL PAIN: 0
CHEST TIGHTNESS: 1
VOMITING: 1

## 2023-07-10 LAB
EKG ATRIAL RATE: 62 BPM
EKG P-R INTERVAL: 134 MS
EKG Q-T INTERVAL: 390 MS
EKG QRS DURATION: 100 MS
EKG QTC CALCULATION (BAZETT): 395 MS
EKG R AXIS: -27 DEGREES
EKG T AXIS: 121 DEGREES
EKG VENTRICULAR RATE: 62 BPM

## 2023-07-10 PROCEDURE — 93010 ELECTROCARDIOGRAM REPORT: CPT | Performed by: INTERNAL MEDICINE

## 2023-07-24 ENCOUNTER — TELEPHONE (OUTPATIENT)
Dept: NEUROLOGY | Age: 23
End: 2023-07-24

## 2023-07-26 ENCOUNTER — HOSPITAL ENCOUNTER (EMERGENCY)
Age: 23
Discharge: HOME | End: 2023-07-27
Attending: EMERGENCY MEDICINE
Payer: MEDICAID

## 2023-07-26 VITALS
WEIGHT: 210.7 LBS | TEMPERATURE: 98.4 F | HEART RATE: 69 BPM | OXYGEN SATURATION: 99 % | SYSTOLIC BLOOD PRESSURE: 90 MMHG | BODY MASS INDEX: 41.15 KG/M2 | RESPIRATION RATE: 20 BRPM | DIASTOLIC BLOOD PRESSURE: 75 MMHG

## 2023-07-26 DIAGNOSIS — Z91.148 NONCOMPLIANCE WITH MEDICATION REGIMEN: ICD-10-CM

## 2023-07-26 DIAGNOSIS — R56.9 SEIZURE (HCC): Primary | ICD-10-CM

## 2023-07-26 LAB
ANION GAP SERPL CALCULATED.3IONS-SCNC: 12 MMOL/L (ref 9–17)
BASOPHILS # BLD: 0.07 K/UL (ref 0–0.2)
BASOPHILS NFR BLD: 1 % (ref 0–2)
BUN SERPL-MCNC: 12 MG/DL (ref 6–20)
BUN/CREAT SERPL: 24 (ref 9–20)
CALCIUM SERPL-MCNC: 9.2 MG/DL (ref 8.6–10.4)
CHLORIDE SERPL-SCNC: 105 MMOL/L (ref 98–107)
CO2 SERPL-SCNC: 22 MMOL/L (ref 20–31)
CREAT SERPL-MCNC: 0.5 MG/DL (ref 0.5–0.9)
EOSINOPHIL # BLD: 0.17 K/UL (ref 0–0.44)
EOSINOPHILS RELATIVE PERCENT: 2 % (ref 1–4)
ERYTHROCYTE [DISTWIDTH] IN BLOOD BY AUTOMATED COUNT: 12.7 % (ref 11.8–14.4)
GFR SERPL CREATININE-BSD FRML MDRD: >60 ML/MIN/1.73M2
GLUCOSE SERPL-MCNC: 97 MG/DL (ref 70–99)
HCT VFR BLD AUTO: 41 % (ref 36.3–47.1)
HGB BLD-MCNC: 13.9 G/DL (ref 11.9–15.1)
IMM GRANULOCYTES # BLD AUTO: 0.02 K/UL (ref 0–0.3)
IMM GRANULOCYTES NFR BLD: 0 %
LEVETIRACETAM SERPL-MCNC: <2 UG/ML
LYMPHOCYTES NFR BLD: 1.73 K/UL (ref 1.1–3.7)
LYMPHOCYTES RELATIVE PERCENT: 23 % (ref 24–43)
MCH RBC QN AUTO: 28 PG (ref 25.2–33.5)
MCHC RBC AUTO-ENTMCNC: 33.9 G/DL (ref 28.4–34.8)
MCV RBC AUTO: 82.7 FL (ref 82.6–102.9)
MONOCYTES NFR BLD: 0.72 K/UL (ref 0.1–1.2)
MONOCYTES NFR BLD: 9 % (ref 3–12)
NEUTROPHILS NFR BLD: 65 % (ref 36–65)
NEUTS SEG NFR BLD: 4.97 K/UL (ref 1.5–8.1)
NRBC BLD-RTO: 0 PER 100 WBC
PLATELET # BLD AUTO: 366 K/UL (ref 138–453)
PMV BLD AUTO: 9.5 FL (ref 8.1–13.5)
POTASSIUM SERPL-SCNC: 3.6 MMOL/L (ref 3.7–5.3)
RBC # BLD AUTO: 4.96 M/UL (ref 3.95–5.11)
SODIUM SERPL-SCNC: 139 MMOL/L (ref 135–144)
WBC OTHER # BLD: 7.7 K/UL (ref 3.5–11.3)

## 2023-07-26 PROCEDURE — 85027 COMPLETE CBC AUTOMATED: CPT

## 2023-07-26 PROCEDURE — 80177 DRUG SCRN QUAN LEVETIRACETAM: CPT

## 2023-07-26 PROCEDURE — 80048 BASIC METABOLIC PNL TOTAL CA: CPT

## 2023-07-26 PROCEDURE — 6370000000 HC RX 637 (ALT 250 FOR IP): Performed by: EMERGENCY MEDICINE

## 2023-07-26 PROCEDURE — 99283 EMERGENCY DEPT VISIT LOW MDM: CPT

## 2023-07-26 PROCEDURE — 36415 COLL VENOUS BLD VENIPUNCTURE: CPT

## 2023-07-26 RX ORDER — ACETAMINOPHEN 500 MG
1000 TABLET ORAL ONCE
Status: COMPLETED | OUTPATIENT
Start: 2023-07-26 | End: 2023-07-26

## 2023-07-26 RX ADMIN — ACETAMINOPHEN 1000 MG: 500 TABLET ORAL at 23:40

## 2023-07-26 ASSESSMENT — PAIN - FUNCTIONAL ASSESSMENT: PAIN_FUNCTIONAL_ASSESSMENT: 0-10

## 2023-07-26 ASSESSMENT — PAIN DESCRIPTION - DESCRIPTORS: DESCRIPTORS: THROBBING

## 2023-07-26 ASSESSMENT — PAIN SCALES - GENERAL
PAINLEVEL_OUTOF10: 7
PAINLEVEL_OUTOF10: 6

## 2023-07-26 ASSESSMENT — PAIN DESCRIPTION - LOCATION: LOCATION: HEAD

## 2023-07-27 RX ORDER — LEVETIRACETAM 500 MG/1
1000 TABLET ORAL 2 TIMES DAILY
Qty: 60 TABLET | Refills: 3 | Status: SHIPPED | OUTPATIENT
Start: 2023-07-27

## 2023-07-27 ASSESSMENT — ENCOUNTER SYMPTOMS
EYE REDNESS: 0
DIARRHEA: 0
RHINORRHEA: 0
COLOR CHANGE: 0
COUGH: 0
NAUSEA: 0
SHORTNESS OF BREATH: 0
EYE DISCHARGE: 0
SORE THROAT: 0
VOMITING: 0

## 2023-07-27 NOTE — ED PROVIDER NOTES
Musculoskeletal:      Cervical back: Normal range of motion and neck supple. Skin:     General: Skin is warm. Findings: No rash. Neurological:      Mental Status: She is alert and oriented to person, place, and time. GCS: GCS eye subscore is 4. GCS verbal subscore is 5. GCS motor subscore is 6. Psychiatric:         Speech: Speech normal.       MEDICAL DECISION MAKING / ED COURSE:   Summary of Patient Presentation:    23-year-old female presents with complaints of seizures, differential considerations included breakthrough seizures, medication noncompliance, psychogenic seizures. The patient's Keppra level came back at less than 2, I questioned her again about whether she was taking the medications, she states that she was not taking the medicines. Shared Decision Making: The patient was involved in his/her plan of care through shared decision making. The testing that was ordered was discussed with the patient. Any medications that may have been ordered were discussed with the patient    Code Status Discussion:      \"ED Course\" Notes From Epic Narrator:         CRITICAL CARE:       PROCEDURES:    Procedures      DATA FOR LAB AND RADIOLOGY TESTS ORDERED BELOW ARE REVIEWED BY THE ED CLINICIAN:    RADIOLOGY: All x-rays, CT, MRI, and formal ultrasound images (except ED bedside ultrasound) are read by the radiologist, see reports below, unless otherwise noted in MDM or here. Reports below are reviewed by myself. No orders to display       LABS: Lab orders shown below, the results are reviewed by myself, and all abnormals are listed below.   Labs Reviewed   CBC WITH AUTO DIFFERENTIAL - Abnormal; Notable for the following components:       Result Value    Lymphocytes % 23 (*)     All other components within normal limits   BASIC METABOLIC PANEL - Abnormal; Notable for the following components:    Bun/Cre Ratio 24 (*)     Potassium 3.6 (*)     All other components within normal limits LEVETIRACETAM LEVEL       Vitals Reviewed:    Vitals:    07/26/23 2327 07/26/23 2332 07/26/23 2337 07/26/23 2342   BP:  90/75     Pulse: 61 95 69 69   Resp: 21 22 18 20   Temp:       TempSrc:       SpO2: 98% 99% 98% 99%   Weight:         MEDICATIONS GIVEN TO PATIENT THIS ENCOUNTER:  Orders Placed This Encounter   Medications    acetaminophen (TYLENOL) tablet 1,000 mg    levETIRAcetam (KEPPRA) 500 MG tablet     Sig: Take 2 tablets by mouth 2 times daily     Dispense:  60 tablet     Refill:  3     DISCHARGE PRESCRIPTIONS:  Discharge Medication List as of 7/27/2023 12:29 AM        PHYSICIAN CONSULTS ORDERED THIS ENCOUNTER:  None  FINAL IMPRESSION      1. Seizure ([address])    2.  Noncompliance with medication regimen          DISPOSITION/PLAN   DISPOSITION Decision To Discharge 07/27/2023 12:28:42 AM      OUTPATIENT FOLLOW UP THE PATIENT:  Oh Kim MD  [address] [place] [address]  [phone]    Schedule an appointment as soon as possible for a visit in 2 days        MD Irlanda Wyatt MD  07/27/23 4571

## 2023-08-31 ENCOUNTER — HOSPITAL ENCOUNTER (EMERGENCY)
Age: 23
Discharge: HOME | End: 2023-08-31
Attending: EMERGENCY MEDICINE
Payer: MEDICAID

## 2023-08-31 VITALS
BODY MASS INDEX: 41.99 KG/M2 | TEMPERATURE: 98.5 F | RESPIRATION RATE: 16 BRPM | HEART RATE: 111 BPM | WEIGHT: 215 LBS | DIASTOLIC BLOOD PRESSURE: 85 MMHG | SYSTOLIC BLOOD PRESSURE: 112 MMHG | OXYGEN SATURATION: 98 %

## 2023-08-31 DIAGNOSIS — T78.40XA ALLERGIC REACTION, INITIAL ENCOUNTER: Primary | ICD-10-CM

## 2023-08-31 PROCEDURE — 99284 EMERGENCY DEPT VISIT MOD MDM: CPT

## 2023-08-31 PROCEDURE — 6370000000 HC RX 637 (ALT 250 FOR IP): Performed by: EMERGENCY MEDICINE

## 2023-08-31 PROCEDURE — 6360000002 HC RX W HCPCS: Performed by: EMERGENCY MEDICINE

## 2023-08-31 PROCEDURE — 96372 THER/PROPH/DIAG INJ SC/IM: CPT

## 2023-08-31 RX ORDER — DIPHENHYDRAMINE HCL 25 MG
25 CAPSULE ORAL EVERY 6 HOURS PRN
Qty: 20 CAPSULE | Refills: 0 | Status: SHIPPED | OUTPATIENT
Start: 2023-08-31 | End: 2023-09-05

## 2023-08-31 RX ORDER — PREDNISONE 20 MG/1
40 TABLET ORAL ONCE
Status: COMPLETED | OUTPATIENT
Start: 2023-08-31 | End: 2023-08-31

## 2023-08-31 RX ORDER — PREDNISONE 20 MG/1
20 TABLET ORAL DAILY
Qty: 7 TABLET | Refills: 0 | Status: SHIPPED | OUTPATIENT
Start: 2023-08-31 | End: 2023-09-07

## 2023-08-31 RX ORDER — PREDNISONE 20 MG/1
40 TABLET ORAL DAILY
Qty: 14 TABLET | Refills: 0 | Status: SHIPPED | OUTPATIENT
Start: 2023-08-31 | End: 2023-08-31

## 2023-08-31 RX ORDER — DIPHENHYDRAMINE HYDROCHLORIDE 50 MG/ML
25 INJECTION INTRAMUSCULAR; INTRAVENOUS ONCE
Status: COMPLETED | OUTPATIENT
Start: 2023-08-31 | End: 2023-08-31

## 2023-08-31 RX ORDER — DIPHENHYDRAMINE HCL 25 MG
25 CAPSULE ORAL EVERY 6 HOURS PRN
Qty: 20 CAPSULE | Refills: 0 | Status: SHIPPED | OUTPATIENT
Start: 2023-08-31 | End: 2023-08-31

## 2023-08-31 RX ADMIN — DIPHENHYDRAMINE HYDROCHLORIDE 25 MG: 50 INJECTION INTRAMUSCULAR; INTRAVENOUS at 08:06

## 2023-08-31 RX ADMIN — PREDNISONE 40 MG: 20 TABLET ORAL at 08:06

## 2023-08-31 ASSESSMENT — PAIN DESCRIPTION - LOCATION: LOCATION: GENERALIZED

## 2023-08-31 ASSESSMENT — ENCOUNTER SYMPTOMS
ABDOMINAL PAIN: 0
CHEST TIGHTNESS: 0
ABDOMINAL DISTENTION: 0
SHORTNESS OF BREATH: 0
EYE PAIN: 0
BACK PAIN: 0
EYE DISCHARGE: 0
FACIAL SWELLING: 0

## 2023-08-31 ASSESSMENT — PAIN SCALES - GENERAL: PAINLEVEL_OUTOF10: 8

## 2023-08-31 ASSESSMENT — PAIN DESCRIPTION - DESCRIPTORS: DESCRIPTORS: BURNING;ITCHING

## 2023-08-31 ASSESSMENT — PAIN - FUNCTIONAL ASSESSMENT: PAIN_FUNCTIONAL_ASSESSMENT: 0-10

## 2023-08-31 ASSESSMENT — PAIN DESCRIPTION - FREQUENCY: FREQUENCY: CONTINUOUS

## 2023-08-31 NOTE — ED PROVIDER NOTES
EMERGENCY DEPARTMENT ENCOUNTER    Pt Name: Nela Mcgovern  MRN: [id number]  [address] [facility] 2000  Date of evaluation: 8/31/23  CHIEF COMPLAINT       Chief Complaint   Patient presents with    Urticaria     Onset last night, using icy hot and vicks vapor rub, states 15 weeks pregnant     HISTORY OF PRESENT ILLNESS   HPI   The patient is a 58-year-old G2, P3 female 15 weeks pregnant presented to the emergency department secondary to itching. Patient used IcyHot yesterday for muscle aches, noted this morning itching and redness of her arms and legs when she use the IcyHot. She denies any new soaps detergents lotions or medications. She is used IcyHot in the past with no adverse reaction. Denies difficulty swallowing, chest pain or shortness of breath. Patient denies nausea, vomiting, fevers or chills. REVIEW OF SYSTEMS     Review of Systems   Constitutional:  Negative for chills, diaphoresis and fever. HENT:  Negative for congestion, ear pain and facial swelling. Eyes:  Negative for pain, discharge and visual disturbance. Respiratory:  Negative for chest tightness and shortness of breath. Cardiovascular:  Negative for chest pain and palpitations. Gastrointestinal:  Negative for abdominal distention and abdominal pain. Genitourinary:  Negative for difficulty urinating and flank pain. Musculoskeletal:  Negative for back pain. Itching   Skin:  Negative for wound. Neurological:  Negative for dizziness, light-headedness and headaches.    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

## 2023-10-07 NOTE — ED NOTES
Pt presents to the er c/o having a seizure approximately one hour prior to arrival the patient is now seizure free alert and oriented x 4 pt states that she is nine weeks pregnant     Funmilayo Crane RN  07/26/23 5831 Unknown if ever smoked

## 2023-11-07 ENCOUNTER — HOSPITAL ENCOUNTER (OUTPATIENT)
Age: 23
Setting detail: SPECIMEN
Discharge: HOME | End: 2023-11-07

## 2023-11-07 LAB — GLUCOSE SERPL-MCNC: 186 MG/DL (ref 70–99)

## 2023-11-10 LAB
MISCELLANEOUS LAB TEST RESULT: ABNORMAL
TEST NAME: ABNORMAL

## 2023-11-13 ENCOUNTER — HOSPITAL ENCOUNTER (EMERGENCY)
Age: 23
Discharge: LEFT BEFORE DISCHARGE | End: 2023-11-13

## 2023-11-13 VITALS
HEART RATE: 88 BPM | DIASTOLIC BLOOD PRESSURE: 74 MMHG | SYSTOLIC BLOOD PRESSURE: 137 MMHG | HEIGHT: 60 IN | OXYGEN SATURATION: 97 % | BODY MASS INDEX: 44.37 KG/M2 | RESPIRATION RATE: 18 BRPM | WEIGHT: 226 LBS

## 2023-11-13 RX ORDER — IBUPROFEN 200 MG
200 TABLET ORAL EVERY 6 HOURS PRN
COMMUNITY

## 2023-11-13 ASSESSMENT — PAIN DESCRIPTION - FREQUENCY: FREQUENCY: CONTINUOUS

## 2023-11-13 ASSESSMENT — PAIN DESCRIPTION - DESCRIPTORS: DESCRIPTORS: THROBBING;SHARP

## 2023-11-13 ASSESSMENT — PAIN - FUNCTIONAL ASSESSMENT: PAIN_FUNCTIONAL_ASSESSMENT: 0-10

## 2023-11-13 ASSESSMENT — PAIN DESCRIPTION - LOCATION: LOCATION: OTHER (COMMENT)

## 2023-11-13 ASSESSMENT — PAIN DESCRIPTION - PAIN TYPE: TYPE: ACUTE PAIN

## 2023-11-13 ASSESSMENT — PAIN SCALES - GENERAL: PAINLEVEL_OUTOF10: 5

## 2023-11-29 ENCOUNTER — HOSPITAL ENCOUNTER (OUTPATIENT)
Age: 23
Setting detail: SPECIMEN
Discharge: HOME | End: 2023-11-29

## 2023-11-29 LAB
GLUCOSE 2H P 75 G GLC PO SERPL-MCNC: 90 MG/DL (ref 65–99)
GLUCOSE TOLERANCE TEST 1 HOUR: 165 MG/DL (ref 65–184)
GLUCOSE TOLERANCE TEST 2 HOUR: 162 MG/DL (ref 65–139)
GLUCOSE TOLERANCE TEST 3 HOUR: 90 MG/DL (ref 65–130)

## 2025-01-15 NOTE — PROGRESS NOTES
Transitions of Care Pharmacy Service   Medication History Note      The patient does not currently take any prescription or OTC maintenance medications at home. No changes to the patient's home medication list were necessary. Source(s) of information: patient, Surescripts refill report      Please feel free to call me with any questions about this encounter. Thank you.     Emily Baron [address] [place] [address]  Transitions of Care Pharmacy Service  Phone: [phone]  Fax: [phone]    Electronically signed by Emily Baron [address] [place] [address] on 2/17/2023 at 6:53 PM DISPLAY PLAN FREE TEXT